# Patient Record
Sex: FEMALE | Race: WHITE | NOT HISPANIC OR LATINO | Employment: FULL TIME | URBAN - METROPOLITAN AREA
[De-identification: names, ages, dates, MRNs, and addresses within clinical notes are randomized per-mention and may not be internally consistent; named-entity substitution may affect disease eponyms.]

---

## 2019-04-23 ENCOUNTER — TELEPHONE (OUTPATIENT)
Dept: SCHEDULING | Facility: IMAGING CENTER | Age: 54
End: 2019-04-23

## 2019-05-14 ENCOUNTER — OFFICE VISIT (OUTPATIENT)
Dept: MEDICAL GROUP | Facility: PHYSICIAN GROUP | Age: 54
End: 2019-05-14
Payer: COMMERCIAL

## 2019-05-14 VITALS
TEMPERATURE: 97.8 F | BODY MASS INDEX: 29.02 KG/M2 | RESPIRATION RATE: 14 BRPM | WEIGHT: 170 LBS | SYSTOLIC BLOOD PRESSURE: 140 MMHG | DIASTOLIC BLOOD PRESSURE: 72 MMHG | HEIGHT: 64 IN | HEART RATE: 80 BPM | OXYGEN SATURATION: 98 %

## 2019-05-14 DIAGNOSIS — M54.50 ACUTE LEFT-SIDED LOW BACK PAIN WITHOUT SCIATICA: ICD-10-CM

## 2019-05-14 DIAGNOSIS — Z13.6 SCREENING FOR CARDIOVASCULAR CONDITION: ICD-10-CM

## 2019-05-14 DIAGNOSIS — Z12.11 SCREENING FOR COLON CANCER: ICD-10-CM

## 2019-05-14 DIAGNOSIS — E66.3 OVERWEIGHT (BMI 25.0-29.9): ICD-10-CM

## 2019-05-14 DIAGNOSIS — Z12.39 BREAST CANCER SCREENING: ICD-10-CM

## 2019-05-14 DIAGNOSIS — E89.0 POSTABLATIVE HYPOTHYROIDISM: ICD-10-CM

## 2019-05-14 DIAGNOSIS — R61 NIGHT SWEATS: ICD-10-CM

## 2019-05-14 DIAGNOSIS — F17.210 CIGARETTE NICOTINE DEPENDENCE WITHOUT COMPLICATION: ICD-10-CM

## 2019-05-14 DIAGNOSIS — I73.00 RAYNAUD'S DISEASE WITHOUT GANGRENE: ICD-10-CM

## 2019-05-14 LAB
APPEARANCE UR: CLEAR
BILIRUB UR STRIP-MCNC: NEGATIVE MG/DL
COLOR UR AUTO: YELLOW
GLUCOSE UR STRIP.AUTO-MCNC: NEGATIVE MG/DL
KETONES UR STRIP.AUTO-MCNC: NEGATIVE MG/DL
LEUKOCYTE ESTERASE UR QL STRIP.AUTO: NEGATIVE
NITRITE UR QL STRIP.AUTO: NEGATIVE
PH UR STRIP.AUTO: 6.5 [PH] (ref 5–8)
PROT UR QL STRIP: NEGATIVE MG/DL
RBC UR QL AUTO: NEGATIVE
SP GR UR STRIP.AUTO: 1.01
UROBILINOGEN UR STRIP-MCNC: NEGATIVE MG/DL

## 2019-05-14 PROCEDURE — 81002 URINALYSIS NONAUTO W/O SCOPE: CPT | Performed by: FAMILY MEDICINE

## 2019-05-14 PROCEDURE — 99204 OFFICE O/P NEW MOD 45 MIN: CPT | Performed by: FAMILY MEDICINE

## 2019-05-14 RX ORDER — LEVOTHYROXINE SODIUM 0.07 MG/1
75 TABLET ORAL
COMMUNITY
End: 2019-09-18 | Stop reason: SDUPTHER

## 2019-05-14 ASSESSMENT — PATIENT HEALTH QUESTIONNAIRE - PHQ9
5. POOR APPETITE OR OVEREATING: 0 - NOT AT ALL
CLINICAL INTERPRETATION OF PHQ2 SCORE: 1
SUM OF ALL RESPONSES TO PHQ QUESTIONS 1-9: 2

## 2019-05-14 NOTE — ASSESSMENT & PLAN NOTE
She has left sided low back pain that has been present for a month.  It travels into the stomach and hip.  It is described as an ache.  It is there consistently and is constant without any aggravating or alleviating factors.  She denies associated rashes.

## 2019-05-14 NOTE — ASSESSMENT & PLAN NOTE
She is concerned about her weight.  She is watching her diet and active at baseline.  Average daily steps are 15,000

## 2019-05-14 NOTE — PROGRESS NOTES
CC:  Back aching, thyroid issues    HISTORY OF THE PRESENT ILLNESS: Patient is a 54 y.o. female. This pleasant patient is here today to establish care and discuss the following chronic conditions    Health Maintenance: Completed      Acute left-sided low back pain  She has left sided low back pain that has been present for a month.  It travels into the stomach and hip.  It is described as an ache.  It is there consistently and is constant without any aggravating or alleviating factors.  She denies associated rashes.    Postablative hypothyroidism  She has a history of Graves disease with a radioactive iodine.  She was treated with levothyroxine and is currently on 75 mcg.  She has not had labs in awhile, but is concerned about her weight.    Cigarette nicotine dependence without complication  She is a current every day smoker.  She is trying to quit.  She has a 30 pack year history.    Overweight (BMI 25.0-29.9)  She is concerned about her weight.  She is watching her diet and active at baseline.  Average daily steps are 15,000    Raynaud's disease without gangrene  She describes episodes where her fingers will get cold and turn white, then turn purple and feel warm.  This has been happening for some time.  She denies a history of skin changes.    Night sweats  She describes having episodes of sweating at night.  She will wake up and be drenched.  Symptoms usually resolve if she turns over or turns the fan on.  She denies any fevers or systemic symptoms.  She has not been having periods for several years.      Allergies: Patient has no known allergies.    Current Outpatient Prescriptions Ordered in [facility]   Medication Sig Dispense Refill   • levothyroxine (SYNTHROID) 75 MCG Tab Take 75 mcg by mouth Every morning on an empty stomach.     • aspirin 81 MG tablet Take 81 mg by mouth every day.       No current [facility]-ordered facility-administered medications on file.        Past Medical History:   Diagnosis Date   • CHF  (congestive heart failure) (HCC)     with Graves, resolved       Past Surgical History:   Procedure Laterality Date   • CATARACT EXTRACTION WITH IOL     • PRIMARY C SECTION     • TUBAL LIGATION         Social History   Substance Use Topics   • Smoking status: Current Every Day Smoker     Packs/day: 1.00     Years: 30.00     Types: Cigarettes   • Smokeless tobacco: Never Used   • Alcohol use 4.2 oz/week     7 Cans of beer per week       Social History     Social History Narrative    [employer]       Family History   Problem Relation Age of Onset   • Cancer Mother         bladder, tobacco   • Hyperlipidemia Mother    • Heart Disease Father         afib   • Diabetes Maternal Grandfather    • Heart Attack Paternal Grandfather 94   • Other Maternal Aunt         MS   • Heart Disease Paternal Aunt         valve replacement       ROS:     - Constitutional: Negative for fever, chills, unexpected weight change, and fatigue/generalized weakness.     - HEENT: Negative for headaches, vision changes, hearing changes, ear pain, ear discharge, rhinorrhea, sinus congestion, sore throat, and neck pain.      - Respiratory: Negative for cough, sputum production, chest congestion, dyspnea, wheezing, and crackles.      - Cardiovascular: Negative for chest pain, palpitations, orthopnea, and bilateral lower extremity edema.     - Gastrointestinal: Negative for heartburn, nausea, vomiting, abdominal pain, hematochezia, melena, diarrhea, constipation, and greasy/foul-smelling stools.     - Genitourinary: Negative for dysuria, polyuria, hematuria, pyuria, urinary urgency, and urinary incontinence.    - Musculoskeletal: Negative for myalgias, back pain, and joint pain.     - Skin: Negative for rash, itching, cyanotic skin color change.     - Neurological: Negative for dizziness, tingling, tremors, focal sensory deficit, focal weakness and headaches.     - Endo/Heme/Allergies: Does not bruise/bleed easily.     -  "Psychiatric/Behavioral: Negative for depression, suicidal/homicidal ideation and memory loss.        Exam: /72 (BP Location: Right arm, Patient Position: Sitting)   Pulse 80   Temp 36.6 °C (97.8 °F)   Resp 14   Ht 1.626 m (5' 4\")   Wt 77.1 kg (170 lb)   SpO2 98%  Body mass index is 29.18 kg/m².    General: Normal appearing. No distress.  HEENT: Normocephalic. Eyes conjunctiva clear lids without ptosis, pupils equal and reactive to light accommodation, ears normal shape and contour, canals are clear bilaterally, tympanic membranes are benign, nasal mucosa benign, oropharynx is without erythema, edema or exudates.   Neck: Supple without JVD or bruit. Thyroid is not enlarged.  Pulmonary: Clear to ausculation.  Normal effort. No rales, ronchi, or wheezing.  Cardiovascular: Regular rate and rhythm without murmur. Carotid and radial pulses are intact and equal bilaterally.  Abdomen: Soft, nontender, nondistended. Normal bowel sounds. Liver and spleen are not palpable, left sided CVA tenderness is present  Neurologic: Grossly nonfocal  Lymph: No cervical, supraclavicular or axillary lymph nodes are palpable  Skin: Warm and dry.  No obvious lesions.  Back: BACK EXAM:    General: alert, no distress  Body habitus: not obese  Gait: normal  Visible deformity of trunk? no  Tenderness of vertebral spinous processes? no  Tenderness of other back or buttock areas? yes - left sided CVA tenderness, no SI joint pain or trochanteric bursitis    REFLEXES:  Knee jerk (L4): left 2+; right 2+  Ankle jerk (S1): left 2+; right 2+    STRENGTH TESTING:    Quadriceps (L4): left 5/5; right 5/5   Hamstrings (L5 and S1): left 5/5; right 5/5   Ankle dorsiflexion (L4 and L5): left 5/5; right 5/5   Ankle plantarflexion (S1): left 5/5; right 5/5  SENSORY EXAMINATION:  Intact to light touch throughout lower extremities    STRAIGHT LEG RAISE TESTING:   Left: raised to 180 degrees; sciatic sx evoked by just raising the leg? no; evoked with " passive ankle dorsiflexion? no   Right: raised to 180 degrees; sciatic sx evoked by just raising the leg? no; evoked with passive ankle dorsiflexion? no        Psych: Normal mood and affect. Alert and oriented x3. Judgment and insight is normal.    Please note that this dictation was created using voice recognition software. I have made every reasonable attempt to correct obvious errors, but I expect that there are errors of grammar and possibly content that I did not discover before finalizing the note.      Assessment/Plan  1. Acute left-sided low back pain without sciatica  Her pain is fairly focal and not associated with urinary changes.  At this point her back exam is also reassuring.  We will order the routine labs in his lungs her kidney function looks okay and symptoms are not worsening further work-up is unlikely to be helpful at this time.  If symptoms are worsening, there are new urinary evidence of changes in urinary function and ultrasound and further labs may be beneficial.  Symptoms are more likely due to mild spondylosis which is manageable.  - POCT Urinalysis    2. Cigarette nicotine dependence without complication  We discussed the importance of tobacco cessation.  I [place] [facility] options and she is not interested at this time.  She will try to quit and we will continue to follow.    3. Postablative hypothyroidism  She has not had recent labs.  She will continue her current medicines and we will confirm normal TSH levels.  - TSH WITH REFLEX TO FT4; Future    4. Overweight (BMI 25.0-29.9)  She is in the overweight category.  We reviewed dietary and lifestyle guidelines.  We will assess thyroid function.    5. Screening for cardiovascular condition  Routine lipid and fasting glucose screening were ordered based on indications.  I also ordered a metabolic panel.  - Lipid Profile; Future  - Comp Metabolic Panel; Future    6. Night sweats  She does describe some symptoms of night sweats that  do not appear to be excessive.  I suspect this is a perimenopausal climacteric state but given her smoking history would like to check a CBC.  - CBC WITH DIFFERENTIAL; Future    7. Raynaud's disease without gangrene  Symptoms are stable.  We discussed symptomatic management of avoiding excessive temperatures.  Consider amlodipine if this becomes unmanageable.    8. Screening for colon cancer  The indications for colon cancer screening were discussed based on her age were discussed.  As she is at average risk the options for colon cancer screening include colonoscopy, annual FIT testing and sigmoidoscopy.  The risks and benefits of the different options were discussed.  She elects to proceed with FIT testing which was ordered.    - OCCULT BLOOD FECES IMMUNOASSAY; Future    9. Breast cancer screening    - [id number]; Future

## 2019-05-14 NOTE — ASSESSMENT & PLAN NOTE
She describes episodes where her fingers will get cold and turn white, then turn purple and feel warm.  This has been happening for some time.  She denies a history of skin changes.

## 2019-05-14 NOTE — ASSESSMENT & PLAN NOTE
She has a history of Graves disease with a radioactive iodine.  She was treated with levothyroxine and is currently on 75 mcg.  She has not had labs in awhile, but is concerned about her weight.

## 2019-05-15 ENCOUNTER — HOSPITAL ENCOUNTER (OUTPATIENT)
Dept: LAB | Facility: MEDICAL CENTER | Age: 54
End: 2019-05-15
Attending: FAMILY MEDICINE
Payer: COMMERCIAL

## 2019-05-15 DIAGNOSIS — E89.0 POSTABLATIVE HYPOTHYROIDISM: ICD-10-CM

## 2019-05-15 DIAGNOSIS — Z13.6 SCREENING FOR CARDIOVASCULAR CONDITION: ICD-10-CM

## 2019-05-15 DIAGNOSIS — R61 NIGHT SWEATS: ICD-10-CM

## 2019-05-15 LAB
ALBUMIN SERPL BCP-MCNC: 4.3 G/DL (ref 3.2–4.9)
ALBUMIN/GLOB SERPL: 1.5 G/DL
ALP SERPL-CCNC: 63 U/L (ref 30–99)
ALT SERPL-CCNC: 14 U/L (ref 2–50)
ANION GAP SERPL CALC-SCNC: 5 MMOL/L (ref 0–11.9)
AST SERPL-CCNC: 15 U/L (ref 12–45)
BASOPHILS # BLD AUTO: 1.3 % (ref 0–1.8)
BASOPHILS # BLD: 0.11 K/UL (ref 0–0.12)
BILIRUB SERPL-MCNC: 0.5 MG/DL (ref 0.1–1.5)
BUN SERPL-MCNC: 10 MG/DL (ref 8–22)
CALCIUM SERPL-MCNC: 9.1 MG/DL (ref 8.5–10.5)
CHLORIDE SERPL-SCNC: 104 MMOL/L (ref 96–112)
CHOLEST SERPL-MCNC: 209 MG/DL (ref 100–199)
CO2 SERPL-SCNC: 30 MMOL/L (ref 20–33)
CREAT SERPL-MCNC: 0.68 MG/DL (ref 0.5–1.4)
EOSINOPHIL # BLD AUTO: 0.23 K/UL (ref 0–0.51)
EOSINOPHIL NFR BLD: 2.6 % (ref 0–6.9)
ERYTHROCYTE [DISTWIDTH] IN BLOOD BY AUTOMATED COUNT: 50.2 FL (ref 35.9–50)
FASTING STATUS PATIENT QL REPORTED: NORMAL
GLOBULIN SER CALC-MCNC: 2.8 G/DL (ref 1.9–3.5)
GLUCOSE SERPL-MCNC: 94 MG/DL (ref 65–99)
HCT VFR BLD AUTO: 45.9 % (ref 37–47)
HDLC SERPL-MCNC: 63 MG/DL
HGB BLD-MCNC: 14.6 G/DL (ref 12–16)
IMM GRANULOCYTES # BLD AUTO: 0.03 K/UL (ref 0–0.11)
IMM GRANULOCYTES NFR BLD AUTO: 0.3 % (ref 0–0.9)
LDLC SERPL CALC-MCNC: 135 MG/DL
LYMPHOCYTES # BLD AUTO: 2.08 K/UL (ref 1–4.8)
LYMPHOCYTES NFR BLD: 23.9 % (ref 22–41)
MCH RBC QN AUTO: 32.3 PG (ref 27–33)
MCHC RBC AUTO-ENTMCNC: 31.8 G/DL (ref 33.6–35)
MCV RBC AUTO: 101.5 FL (ref 81.4–97.8)
MONOCYTES # BLD AUTO: 0.78 K/UL (ref 0–0.85)
MONOCYTES NFR BLD AUTO: 9 % (ref 0–13.4)
NEUTROPHILS # BLD AUTO: 5.48 K/UL (ref 2–7.15)
NEUTROPHILS NFR BLD: 62.9 % (ref 44–72)
NRBC # BLD AUTO: 0 K/UL
NRBC BLD-RTO: 0 /100 WBC
PLATELET # BLD AUTO: 151 K/UL (ref 164–446)
PMV BLD AUTO: 13.7 FL (ref 9–12.9)
POTASSIUM SERPL-SCNC: 4.3 MMOL/L (ref 3.6–5.5)
PROT SERPL-MCNC: 7.1 G/DL (ref 6–8.2)
RBC # BLD AUTO: 4.52 M/UL (ref 4.2–5.4)
SODIUM SERPL-SCNC: 139 MMOL/L (ref 135–145)
TRIGL SERPL-MCNC: 55 MG/DL (ref 0–149)
TSH SERPL DL<=0.005 MIU/L-ACNC: 3.4 UIU/ML (ref 0.38–5.33)
WBC # BLD AUTO: 8.7 K/UL (ref 4.8–10.8)

## 2019-05-15 PROCEDURE — 85025 COMPLETE CBC W/AUTO DIFF WBC: CPT

## 2019-05-15 PROCEDURE — 80053 COMPREHEN METABOLIC PANEL: CPT

## 2019-05-15 PROCEDURE — 36415 COLL VENOUS BLD VENIPUNCTURE: CPT

## 2019-05-15 PROCEDURE — 80061 LIPID PANEL: CPT

## 2019-05-15 PROCEDURE — 84443 ASSAY THYROID STIM HORMONE: CPT

## 2019-05-15 NOTE — ASSESSMENT & PLAN NOTE
She describes having episodes of sweating at night.  She will wake up and be drenched.  Symptoms usually resolve if she turns over or turns the fan on.  She denies any fevers or systemic symptoms.  She has not been having periods for several years.

## 2019-05-16 ENCOUNTER — HOSPITAL ENCOUNTER (OUTPATIENT)
Facility: MEDICAL CENTER | Age: 54
End: 2019-05-16
Attending: FAMILY MEDICINE
Payer: COMMERCIAL

## 2019-05-16 PROCEDURE — 82274 ASSAY TEST FOR BLOOD FECAL: CPT

## 2019-05-19 DIAGNOSIS — Z12.11 SCREENING FOR COLON CANCER: ICD-10-CM

## 2019-05-20 LAB — HEMOCCULT STL QL IA: NEGATIVE

## 2019-06-20 ENCOUNTER — OFFICE VISIT (OUTPATIENT)
Dept: MEDICAL GROUP | Facility: PHYSICIAN GROUP | Age: 54
End: 2019-06-20
Payer: COMMERCIAL

## 2019-06-20 VITALS
DIASTOLIC BLOOD PRESSURE: 74 MMHG | SYSTOLIC BLOOD PRESSURE: 132 MMHG | HEART RATE: 87 BPM | BODY MASS INDEX: 27.77 KG/M2 | TEMPERATURE: 98.1 F | HEIGHT: 65 IN | OXYGEN SATURATION: 96 % | WEIGHT: 166.7 LBS | RESPIRATION RATE: 16 BRPM

## 2019-06-20 DIAGNOSIS — E78.5 HYPERLIPIDEMIA, UNSPECIFIED HYPERLIPIDEMIA TYPE: ICD-10-CM

## 2019-06-20 DIAGNOSIS — D75.89 MACROCYTOSIS WITHOUT ANEMIA: ICD-10-CM

## 2019-06-20 DIAGNOSIS — I10 ESSENTIAL HYPERTENSION: ICD-10-CM

## 2019-06-20 DIAGNOSIS — Z91.89 ENCOUNTER FOR HEPATITIS C VIRUS SCREENING TEST FOR HIGH RISK PATIENT: ICD-10-CM

## 2019-06-20 DIAGNOSIS — E89.0 POSTABLATIVE HYPOTHYROIDISM: ICD-10-CM

## 2019-06-20 DIAGNOSIS — F17.210 CIGARETTE NICOTINE DEPENDENCE WITHOUT COMPLICATION: ICD-10-CM

## 2019-06-20 DIAGNOSIS — Z11.59 ENCOUNTER FOR HEPATITIS C VIRUS SCREENING TEST FOR HIGH RISK PATIENT: ICD-10-CM

## 2019-06-20 PROCEDURE — 99214 OFFICE O/P EST MOD 30 MIN: CPT | Performed by: FAMILY MEDICINE

## 2019-06-20 RX ORDER — NICOTINE 21 MG/24HR
1 PATCH, TRANSDERMAL 24 HOURS TRANSDERMAL EVERY 24 HOURS
Qty: 14 PATCH | Refills: 0 | Status: SHIPPED | OUTPATIENT
Start: 2019-06-20 | End: 2020-08-06

## 2019-06-20 RX ORDER — NICOTINE 21 MG/24HR
1 PATCH, TRANSDERMAL 24 HOURS TRANSDERMAL EVERY 24 HOURS
Qty: 30 PATCH | Refills: 0 | Status: SHIPPED | OUTPATIENT
Start: 2019-06-20 | End: 2020-08-06

## 2019-06-20 NOTE — ASSESSMENT & PLAN NOTE
She is still smoking a pack a day.  She is planning to quit, she is losing a best friend to lung cancer.  She will use patches.

## 2019-06-20 NOTE — ASSESSMENT & PLAN NOTE
Lab Results   Component Value Date/Time    TSHULTRASEN 3.400 05/15/2019 08:31 AM     She is feeling well on her current dose of 75 mcg daily, TSH within range.   Subjective  Kobi Wheatley, 62 y.o. female presents today with:  Chief Complaint   Patient presents with    Hypertension    Hyperlipidemia    Basal Cell Carcinoma       Had flu-now better  Pap due 2022  Colon ca screening due 2021      Past Medical History:   Diagnosis Date    Cancer ([address]     BASAL CELL    Hayfever     Hyperlipidemia     Hypertension     Obesity     Rosacea     Vitamin D deficiency      Past Surgical History:   Procedure Laterality Date    COLONOSCOPY  2011    negative-[facility]  MOHS SURGERY  2008     Social History     Social History    Marital status:      Spouse name: N/A    Number of children: N/A    Years of education: N/A     Occupational History    Not on file. Social History Main Topics    Smoking status: Never Smoker    Smokeless tobacco: Never Used    Alcohol use No    Drug use: Unknown    Sexual activity: Not on file     Other Topics Concern    Not on file     Social History Narrative    No narrative on file     Family History   Problem Relation Age of Onset    Cancer Mother      BREAST    High Blood Pressure Mother     Parkinsonism Mother     Diabetes Mother      No Known Allergies  Current Outpatient Prescriptions   Medication Sig Dispense Refill    atorvastatin (LIPITOR) 20 MG tablet Take 1 tablet by mouth daily 90 tablet 1    hydrochlorothiazide (MICROZIDE) 12.5 MG capsule TAKE ONE CAPSULE BY MOUTH EVERY DAY 90 capsule 1    metFORMIN (GLUCOPHAGE) 500 MG tablet Take 1 tablet by mouth nightly 90 tablet 1    doxycycline hyclate (VIBRA-TABS) 100 MG tablet TAKE 1 TABLET BY MOUTH TWICE A DAY  3    SOOLANTRA 1 % CREA APPLY TO AFFECTED AREAS EVERY DAY  5    fluticasone (FLONASE) 50 MCG/ACT nasal spray 1 spray by Nasal route daily 1 Bottle 3    Calcium Carbonate-Vit D-Min 9165-0022 MG-UNIT CHEW Take 1 tablet by mouth daily. 90 tablet 3     No current facility-administered medications for this visit.       The patient denies any history of thyromegaly. RESPIRATORY:   Clear/ Equal breath sounds /No acute respiratory distress. No wheezes,rales, or rhonchi. No percussive abnormalities    HEART: Regular rhythm without murmur, rub or gallop. ABDOMEN: overwt  Soft, non tender. No masses, guarding or rebound. Normo active bowel sounds. EXTREMITIES:  No edema in any extremity. No cyanosis or clubbing. 2+ dorsalis pedis pulses bilaterally          Assessment & Plan   1. Mixed hyperlipidemia  Lipid Panel    CBC    Comprehensive Metabolic Panel   2. Essential hypertension  hydrochlorothiazide (MICROZIDE) 12.5 MG capsule    Lipid Panel    CBC    Comprehensive Metabolic Panel   3. Basal cell carcinoma, unspecified site     4. Prediabetes  metFORMIN (GLUCOPHAGE) 500 MG tablet    Hemoglobin A1C     No orders of the defined types were placed in this encounter. No orders of the defined types were placed in this encounter. There are no discontinued medications. No Follow-up on file. Is working out  Doing well  Diet changes being made  Anamika Fairbanks is advised to follow up ASAP if condition deteriorates or problems arise and if no information on test results to patient in the next 1 month they are advised to call us.      Nichloe Booker MD

## 2019-06-20 NOTE — ASSESSMENT & PLAN NOTE
Lab Results   Component Value Date/Time    CHOLSTRLTOT 209 (H) 05/15/2019 08:31 AM     (H) 05/15/2019 08:31 AM    HDL 63 05/15/2019 08:31 AM    TRIGLYCERIDE 55 05/15/2019 08:31 AM       The 10-year ASCVD risk score (Saritha PALACIOS Jr., et al., 2013) is: 4.4%

## 2019-06-20 NOTE — PROGRESS NOTES
CC: abnormal labs    HISTORY OF PRESENT ILLNESS: Patient is a 54 y.o. female established patient who presents today to discuss the following chronic conditions    Health Maintenance: Completed    Postablative hypothyroidism  Lab Results   Component Value Date/Time    TSHULTRASEN 3.400 05/15/2019 08:31 AM     She is feeling well on her current dose of 75 mcg daily, TSH within range.    Hyperlipidemia  Lab Results   Component Value Date/Time    CHOLSTRLTOT 209 (H) 05/15/2019 08:31 AM     (H) 05/15/2019 08:31 AM    HDL 63 05/15/2019 08:31 AM    TRIGLYCERIDE 55 05/15/2019 08:31 AM       The 10-year ASCVD risk score (Hesstonabhinav PALACIOS Jr., et al., 2013) is: 4.4%         Cigarette nicotine dependence without complication  She is still smoking a pack a day.  She is planning to quit, she is losing a best friend to lung cancer.  She will use patches.      Macrocytosis without anemia  Lab Results   Component Value Date/Time    WBC 8.7 05/15/2019 08:31 AM    RBC 4.52 05/15/2019 08:31 AM    HEMOGLOBIN 14.6 05/15/2019 08:31 AM    HEMATOCRIT 45.9 05/15/2019 08:31 AM    .5 (H) 05/15/2019 08:31 AM    MCH 32.3 05/15/2019 08:31 AM    MCHC 31.8 (L) 05/15/2019 08:31 AM    MPV 13.7 (H) 05/15/2019 08:31 AM    NEUTSPOLYS 62.90 05/15/2019 08:31 AM    LYMPHOCYTES 23.90 05/15/2019 08:31 AM    MONOCYTES 9.00 05/15/2019 08:31 AM    EOSINOPHILS 2.60 05/15/2019 08:31 AM    BASOPHILS 1.30 05/15/2019 08:31 AM        Her MCV is elevated.  She is drinking above recommendations for women.      Essential hypertension  Her BP is at goal today off medications.  We will continue to monitor.      Patient Active Problem List    Diagnosis Date Noted   • Macrocytosis without anemia 06/20/2019   • Essential hypertension 06/20/2019   • Hyperlipidemia 06/20/2019   • Acute left-sided low back pain 05/14/2019   • Cigarette nicotine dependence without complication 05/14/2019   • Postablative hypothyroidism 05/14/2019   • Overweight (BMI 25.0-29.9) 05/14/2019    • Night sweats 05/14/2019   • Raynaud's disease without gangrene 05/14/2019      Allergies:Patient has no known allergies.    Current Outpatient Prescriptions   Medication Sig Dispense Refill   • nicotine (NICODERM) 21 MG/24HR PATCH 24 HR Apply 1 Patch to skin as directed every 24 hours. 30 Patch 0   • nicotine (NICODERM) 14 MG/24HR PATCH 24 HR Apply 1 Patch to skin as directed every 24 hours. 14 Patch 0   • nicotine (NICODERM) 7 MG/24HR PATCH 24 HR Apply 1 Patch to skin as directed every 24 hours. 14 Patch 0   • levothyroxine (SYNTHROID) 75 MCG Tab Take 75 mcg by mouth Every morning on an empty stomach.     • aspirin 81 MG tablet Take 81 mg by mouth every day.       No current facility-administered medications for this visit.        Social History   Substance Use Topics   • Smoking status: Current Every Day Smoker     Packs/day: 1.00     Years: 30.00     Types: Cigarettes   • Smokeless tobacco: Never Used   • Alcohol use 8.4 oz/week     14 Cans of beer per week     Social History     Social History Narrative    [employer]       Family History   Problem Relation Age of Onset   • Cancer Mother         bladder, tobacco   • Hyperlipidemia Mother    • Heart Disease Father         afib   • Diabetes Maternal Grandfather    • Heart Attack Paternal Grandfather 94   • Other Maternal Aunt         MS   • Heart Disease Paternal Aunt         valve replacement       Review of Systems:      - Constitutional: Negative for fever, chills, unexpected weight change, and fatigue/generalized weakness.       - Respiratory: Negative for cough, sputum production, chest congestion, dyspnea, wheezing, and crackles.      - Cardiovascular: Negative for chest pain, palpitations, orthopnea, and bilateral lower extremity edema.     - Gastrointestinal: Negative for heartburn, nausea, vomiting, abdominal pain, hematochezia, melena, diarrhea, constipation, and greasy/foul-smelling stools.     - Genitourinary: Negative for dysuria,  "polyuria, hematuria, pyuria, urinary urgency, and urinary incontinence.    - Psychiatric/Behavioral: Negative for depression, suicidal/homicidal ideation and memory loss.        Exam:    /74 (BP Location: Right arm, Patient Position: Sitting, BP Cuff Size: Adult)   Pulse 87   Temp 36.7 °C (98.1 °F) (Temporal)   Resp 16   Ht 1.651 m (5' 5\")   Wt 75.6 kg (166 lb 11.2 oz)   SpO2 96%  Body mass index is 27.74 kg/m².    General:  Well nourished, well developed female in NAD  Head is grossly normal.  Neck: Supple without JVD or bruit. Thyroid is not enlarged.  Pulmonary: Clear to ausculation and percussion.  Normal effort. No rales, ronchi, or wheezing.  Cardiovascular: Regular rate and rhythm without murmur. Carotid and radial pulses are intact and equal bilaterally.  Extremities: no clubbing, cyanosis, or edema.      Assessment/Plan:  1. Macrocytosis without anemia  This is a new problem, will investigate for nutritional abnormalities.  - VITAMIN B12; Future  - FOLATE; Future    2. Postablative hypothyroidism  TSH within range, continue current dose of levothyroxine.    3. Essential hypertension  Controlled off medication, if this remains low may have resolved.    4. Cigarette nicotine dependence without complication  she is in preparation regarding tobacco cessation.  General guidelines for tobacco cessation including the risks of smoking and benefits of quitting were discussed.  Pharmacologic options were also reviewed.  she would like to try nicotine patches, the use of which was reviewed and they were prescribed if helpful..    - nicotine (NICODERM) 21 MG/24HR PATCH 24 HR; Apply 1 Patch to skin as directed every 24 hours.  Dispense: 30 Patch; Refill: 0  - nicotine (NICODERM) 14 MG/24HR PATCH 24 HR; Apply 1 Patch to skin as directed every 24 hours.  Dispense: 14 Patch; Refill: 0  - nicotine (NICODERM) 7 MG/24HR PATCH 24 HR; Apply 1 Patch to skin as directed every 24 hours.  Dispense: 14 Patch; Refill: " 0    5. Encounter for hepatitis C virus screening test for high risk patient  Routine screening reviewed, she has had blood transfusions and would like to have this done.  - HEP C VIRUS ANTIBODY; Future    6. Hyperlipidemia, unspecified hyperlipidemia type  her 10 year cardiovascular risk is not elevated.  The recommendations regarding treatment of elevated cholesterol were reviewed.  she does not meet criteria for treatment at this time with aspirin and a statin.   Lifestyle interventions including exercise and a diet low in trans and saturated fat and added cholesterol were reviewed.  We will monitor yearly for now.

## 2019-06-20 NOTE — PATIENT INSTRUCTIONS
"B vitamin complex or B12 supplement daily      High Cholesterol  High cholesterol is a condition in which the blood has high levels of a white, waxy, fat-like substance (cholesterol). The human body needs small amounts of cholesterol. The liver makes all the cholesterol that the body needs. Extra (excess) cholesterol comes from the food that we eat.  Cholesterol is carried from the liver by the blood through the blood vessels. If you have high cholesterol, deposits (plaques) may build up on the walls of your blood vessels (arteries). Plaques make the arteries narrower and stiffer. Cholesterol plaques increase your risk for heart attack and stroke. Work with your health care provider to keep your cholesterol levels in a healthy range.  What increases the risk?  This condition is more likely to develop in people who:  · Eat foods that are high in animal fat (saturated fat) or cholesterol.  · Are overweight.  · Are not getting enough exercise.  · Have a family history of high cholesterol.  What are the signs or symptoms?  There are no symptoms of this condition.  How is this diagnosed?  This condition may be diagnosed from the results of a blood test.  · If you are older than age 20, your health care provider may check your cholesterol every 4-6 years.  · You may be checked more often if you already have high cholesterol or other risk factors for heart disease.  The blood test for cholesterol measures:  · \"Bad\" cholesterol (LDL cholesterol). This is the main type of cholesterol that causes heart disease. The desired level for LDL is less than 100.  · \"Good\" cholesterol (HDL cholesterol). This type helps to protect against heart disease by cleaning the arteries and carrying the LDL away. The desired level for HDL is 60 or higher.  · Triglycerides. These are fats that the body can store or burn for energy. The desired number for triglycerides is lower than 150.  · Total cholesterol. This is a measure of the total amount " of cholesterol in your blood, including LDL cholesterol, HDL cholesterol, and triglycerides. A healthy number is less than 200.  How is this treated?  This condition is treated with diet changes, lifestyle changes, and medicines.  Diet changes  · This may include eating more whole grains, fruits, vegetables, nuts, and fish.  · This may also include cutting back on red meat and foods that have a lot of added sugar.  Lifestyle changes  · Changes may include getting at least 40 minutes of aerobic exercise 3 times a week. Aerobic exercises include walking, biking, and swimming. Aerobic exercise along with a healthy diet can help you maintain a healthy weight.  · Changes may also include quitting smoking.  Medicines  · Medicines are usually given if diet and lifestyle changes have failed to reduce your cholesterol to healthy levels.  · Your health care provider may prescribe a statin medicine. Statin medicines have been shown to reduce cholesterol, which can reduce the risk of heart disease.  Follow these instructions at home:  Eating and drinking  If told by your health care provider:  · Eat chicken (without skin), fish, veal, shellfish, ground turkey breast, and round or loin cuts of red meat.  · Do not eat fried foods or fatty meats, such as hot dogs and salami.  · Eat plenty of fruits, such as apples.  · Eat plenty of vegetables, such as broccoli, potatoes, and carrots.  · Eat beans, peas, and lentils.  · Eat grains such as barley, rice, couscous, and bulgur wheat.  · Eat pasta without cream sauces.  · Use skim or nonfat milk, and eat low-fat or nonfat yogurt and cheeses.  · Do not eat or drink whole milk, cream, ice cream, egg yolks, or hard cheeses.  · Do not eat stick margarine or tub margarines that contain trans fats (also called partially hydrogenated oils).  · Do not eat saturated tropical oils, such as coconut oil and palm oil.  · Do not eat cakes, cookies, crackers, or other baked goods that contain trans  fats.  General instructions  · Exercise as directed by your health care provider. Increase your activity level with activities such as gardening, walking, and taking the stairs.  · Take over-the-counter and prescription medicines only as told by your health care provider.  · Do not use any products that contain nicotine or tobacco, such as cigarettes and e-cigarettes. If you need help quitting, ask your health care provider.  · Keep all follow-up visits as told by your health care provider. This is important.  Contact a health care provider if:  · You are struggling to maintain a healthy diet or weight.  · You need help to start on an exercise program.  · You need help to stop smoking.  Get help right away if:  · You have chest pain.  · You have trouble breathing.  This information is not intended to replace advice given to you by your health care provider. Make sure you discuss any questions you have with your health care provider.  Document Released: 12/18/2006 Document Revised: 07/15/2017 Document Reviewed: 06/17/2017  [employer] Interactive Patient Education © 2017 [employer]

## 2019-06-20 NOTE — LETTER
[facility]  Kayleigh Kirk M.D.  [address]  [facility] [address]  Fax: [phone]   Authorization for Release/Disclosure of   Protected Health Information   Name: CHERYL EMANUEL : 1965 SSN: [id number]   Address: [address] [place] [address] Phone:    [phone] (home)    I authorize the entity listed below to release/disclose the PHI below to:   [facility]/Kayleigh Kirk M.D. and Kayleigh Kirk M.D.   Provider or Entity Name:     Address   City, State, [facility]   Phone:      Fax:     Reason for request: continuity of care   Information to be released:    [  ] LAST COLONOSCOPY,  including any PATH REPORT and follow-up  [  ] LAST FIT/COLOGUARD RESULT [  ] LAST DEXA  [  ] LAST MAMMOGRAM  [  ] LAST PAP  [  ] LAST LABS [  ] RETINA EXAM REPORT  [  ] IMMUNIZATION RECORDS  [  ] Release all info      [  ] Check here and initial the line next to each item to release ALL health information INCLUDING  _____ Care and treatment for drug and / or alcohol abuse  _____ HIV testing, infection status, or AIDS  _____ Genetic Testing    DATES OF SERVICE OR TIME PERIOD TO BE DISCLOSED: _____________  I understand and acknowledge that:  * This Authorization may be revoked at any time by you in writing, except if your health information has already been used or disclosed.  * Your health information that will be used or disclosed as a result of you signing this authorization could be re-disclosed by the recipient. If this occurs, your re-disclosed health information may no longer be protected by State or Federal laws.  * You may refuse to sign this Authorization. Your refusal will not affect your ability to obtain treatment.  * This Authorization becomes effective upon signing and will  on (date) __________.      If no date is indicated, this Authorization will  one (1) year from the signature date.    Name: Cheryl Emanuel    Signature:   Date:     2019       PLEASE  FAX REQUESTED RECORDS BACK TO: [phone]

## 2019-06-20 NOTE — ASSESSMENT & PLAN NOTE
Lab Results   Component Value Date/Time    WBC 8.7 05/15/2019 08:31 AM    RBC 4.52 05/15/2019 08:31 AM    HEMOGLOBIN 14.6 05/15/2019 08:31 AM    HEMATOCRIT 45.9 05/15/2019 08:31 AM    .5 (H) 05/15/2019 08:31 AM    MCH 32.3 05/15/2019 08:31 AM    MCHC 31.8 (L) 05/15/2019 08:31 AM    MPV 13.7 (H) 05/15/2019 08:31 AM    NEUTSPOLYS 62.90 05/15/2019 08:31 AM    LYMPHOCYTES 23.90 05/15/2019 08:31 AM    MONOCYTES 9.00 05/15/2019 08:31 AM    EOSINOPHILS 2.60 05/15/2019 08:31 AM    BASOPHILS 1.30 05/15/2019 08:31 AM        Her MCV is elevated.  She is drinking above recommendations for women.

## 2019-09-23 RX ORDER — LEVOTHYROXINE SODIUM 0.07 MG/1
75 TABLET ORAL
Qty: 90 TAB | Refills: 0 | Status: SHIPPED | OUTPATIENT
Start: 2019-09-23 | End: 2020-01-31

## 2019-10-17 ENCOUNTER — OFFICE VISIT (OUTPATIENT)
Dept: URGENT CARE | Facility: CLINIC | Age: 54
End: 2019-10-17
Payer: COMMERCIAL

## 2019-10-17 ENCOUNTER — APPOINTMENT (OUTPATIENT)
Dept: RADIOLOGY | Facility: IMAGING CENTER | Age: 54
End: 2019-10-17
Attending: PHYSICIAN ASSISTANT
Payer: COMMERCIAL

## 2019-10-17 VITALS
SYSTOLIC BLOOD PRESSURE: 128 MMHG | OXYGEN SATURATION: 94 % | TEMPERATURE: 98 F | BODY MASS INDEX: 27.62 KG/M2 | HEART RATE: 95 BPM | WEIGHT: 166 LBS | DIASTOLIC BLOOD PRESSURE: 72 MMHG

## 2019-10-17 DIAGNOSIS — J22 LRTI (LOWER RESPIRATORY TRACT INFECTION): Primary | ICD-10-CM

## 2019-10-17 DIAGNOSIS — J22 LRTI (LOWER RESPIRATORY TRACT INFECTION): ICD-10-CM

## 2019-10-17 PROCEDURE — 99214 OFFICE O/P EST MOD 30 MIN: CPT | Performed by: PHYSICIAN ASSISTANT

## 2019-10-17 PROCEDURE — 71046 X-RAY EXAM CHEST 2 VIEWS: CPT | Mod: TC | Performed by: PHYSICIAN ASSISTANT

## 2019-10-17 RX ORDER — DOXYCYCLINE HYCLATE 100 MG
100 TABLET ORAL 2 TIMES DAILY
Qty: 14 TAB | Refills: 0 | Status: SHIPPED | OUTPATIENT
Start: 2019-10-17 | End: 2019-10-24

## 2019-10-17 RX ORDER — METHYLPREDNISOLONE 4 MG/1
TABLET ORAL
Qty: 21 TAB | Refills: 0 | Status: SHIPPED
Start: 2019-10-17 | End: 2020-08-06

## 2019-10-17 ASSESSMENT — ENCOUNTER SYMPTOMS
BACK PAIN: 1
DIARRHEA: 0
PALPITATIONS: 0
SHORTNESS OF BREATH: 0
SORE THROAT: 0
NAUSEA: 0
VOMITING: 0
COUGH: 1
SINUS PAIN: 0
CHILLS: 0
WHEEZING: 0
SPUTUM PRODUCTION: 1
FOCAL WEAKNESS: 0
TINGLING: 0
RHINORRHEA: 0
FEVER: 0
HEADACHES: 0
HEMOPTYSIS: 0
SENSORY CHANGE: 0
ABDOMINAL PAIN: 0
MYALGIAS: 1

## 2019-10-17 ASSESSMENT — COPD QUESTIONNAIRES: COPD: 0

## 2019-10-17 NOTE — PROGRESS NOTES
Subjective:      Cheryl Squires is a 54 y.o. female who presents with Cough            Cough   This is a new problem. Episode onset: 3 days. The problem has been gradually worsening. The cough is productive of purulent sputum and productive of brown sputum. Associated symptoms include myalgias. Pertinent negatives include no chest pain, chills, ear congestion, ear pain, fever, headaches, hemoptysis, nasal congestion, postnasal drip, rash, rhinorrhea, sore throat, shortness of breath or wheezing. Associated symptoms comments: Chest congestion and pain in left chest that radiated around the side to the left back. Risk factors for lung disease include smoking/tobacco exposure. She has tried nothing for the symptoms. Her past medical history is significant for pneumonia (pneumonia several years ago- symptoms feel similar). There is no history of asthma, bronchitis or COPD.     Past Medical History:   Diagnosis Date   • CHF (congestive heart failure) (HCC)     with Graves, resolved       Past Surgical History:   Procedure Laterality Date   • CATARACT EXTRACTION WITH IOL     • PRIMARY C SECTION     • TUBAL LIGATION         Family History   Problem Relation Age of Onset   • Cancer Mother         bladder, tobacco   • Hyperlipidemia Mother    • Heart Disease Father         afib   • Diabetes Maternal Grandfather    • Heart Attack Paternal Grandfather 94   • Other Maternal Aunt         MS   • Heart Disease Paternal Aunt         valve replacement       No Known Allergies    Medications, Allergies, and current problem list reviewed today in Epic    Review of Systems   Constitutional: Positive for malaise/fatigue. Negative for chills and fever.   HENT: Negative for congestion, ear discharge, ear pain, postnasal drip, rhinorrhea, sinus pain and sore throat.    Respiratory: Positive for cough and sputum production. Negative for hemoptysis, shortness of breath and wheezing.    Cardiovascular: Negative for chest pain, palpitations  and leg swelling.   Gastrointestinal: Negative for abdominal pain, diarrhea, nausea and vomiting.   Musculoskeletal: Positive for back pain and myalgias.        Left chest wall pain   Skin: Negative for rash.   Neurological: Negative for tingling, sensory change, focal weakness and headaches.     All other systems reviewed and are negative.        Objective:     /72   Pulse 95   Temp 36.7 °C (98 °F)   Wt 75.3 kg (166 lb)   SpO2 94%   BMI 27.62 kg/m²      Physical Exam   Constitutional: She is oriented to person, place, and time. She appears well-developed and well-nourished. No distress.   HENT:   Head: Normocephalic and atraumatic.   Right Ear: Tympanic membrane, external ear and ear canal normal.   Left Ear: Tympanic membrane, external ear and ear canal normal.   Nose: Nose normal.   Mouth/Throat: Uvula is midline, oropharynx is clear and moist and mucous membranes are normal.   Eyes: Conjunctivae are normal.   Cardiovascular: Normal rate, regular rhythm and normal heart sounds. Exam reveals no gallop and no friction rub.   No murmur heard.  Pulmonary/Chest: Effort normal. No respiratory distress. She has no decreased breath sounds. She has no wheezes. She has rhonchi. She has no rales. She exhibits tenderness. She exhibits no crepitus, no edema and no swelling.   Diffuse rhonchi       Neurological: She is alert and oriented to person, place, and time. No cranial nerve deficit.   Skin: Skin is warm and dry. No rash noted.   Psychiatric: She has a normal mood and affect. Her behavior is normal. Judgment and thought content normal.               10/17/2019 10:42 AM    HISTORY/REASON FOR EXAM:  Cough.  Chest congestion    TECHNIQUE/EXAM DESCRIPTION AND NUMBER OF VIEWS:  Two views of the chest.    COMPARISON:  None.    FINDINGS:  The lungs are clear.    The cardiac silhouette: normal in size.    Pleura: There are no pleural effusion or pneumothoraces.    Osseous structures: There is thoracic spondylosis.       Impression       No acute cardiopulmonary abnormality identified.       Assessment/Plan:     1. LRTI (lower respiratory tract infection)  DX-CHEST-2 VIEWS    doxycycline (VIBRAMYCIN) 100 MG Tab    methylPREDNISolone (MEDROL DOSEPAK) 4 MG Tablet Therapy Pack         Current Outpatient Medications:   •  doxycycline (VIBRAMYCIN) 100 MG Tab, Take 1 Tab by mouth 2 times a day for 7 days., Disp: 14 Tab, Rfl: 0  •  methylPREDNISolone (MEDROL DOSEPAK) 4 MG Tablet Therapy Pack, Take as directed on package. 1 pack., Disp: 21 Tab, Rfl: 0      Tobacco cessation counseling was provided to the patient for a duration of three to seven minutes.  Tobacco effects, benefits of cessation and methods to use to achieve this goal were briefly discussed.        Differential diagnoses, Supportive care, and indications for immediate follow-up discussed with patient.   Instructed to return to clinic or nearest emergency department for any change in condition, further concerns, or worsening of symptoms.    The patient demonstrated a good understanding and agreed with the treatment plan.    Sol Church P.A.-C.

## 2019-11-04 ENCOUNTER — TELEPHONE (OUTPATIENT)
Dept: MEDICAL GROUP | Facility: PHYSICIAN GROUP | Age: 54
End: 2019-11-04

## 2019-11-04 DIAGNOSIS — R05.9 COUGH: ICD-10-CM

## 2019-11-04 NOTE — TELEPHONE ENCOUNTER
Pt called stating that she had an upper respiratory infection and went to urgent care. She had a bad reaction to the steroids that she was prescribed so she has not taken them. She stated that she finished the antibiotics but is still having a productive cough and coughing up a thick green mucus. She also states that because of this she is having a lot of trouble sleeping. She would like to know if there was something she could be prescribed or if there was something she could take to help with the mucus and cough.

## 2019-11-05 RX ORDER — BENZONATATE 100 MG/1
100 CAPSULE ORAL 3 TIMES DAILY PRN
Qty: 60 CAP | Refills: 0 | Status: SHIPPED | OUTPATIENT
Start: 2019-11-05 | End: 2020-08-06

## 2019-11-05 NOTE — TELEPHONE ENCOUNTER
I have reviewed her urgent care visit and the imaging.  Her x-ray was negative at the time.  I would not add additional antibiotics at this time with a normal chest x-ray.  She may use over-the-counter cough medications or I can prescribe Tessalon Perles.  I also suggest people to take a nasal steroid such as Flonase and an antihistamine for persistent cough.  I would like to see her in the office if she is having fevers, chills or feeling sicker than previously.

## 2020-02-28 ENCOUNTER — OFFICE VISIT (OUTPATIENT)
Dept: MEDICAL GROUP | Facility: PHYSICIAN GROUP | Age: 55
End: 2020-02-28
Payer: COMMERCIAL

## 2020-02-28 VITALS
SYSTOLIC BLOOD PRESSURE: 118 MMHG | BODY MASS INDEX: 27.71 KG/M2 | OXYGEN SATURATION: 97 % | DIASTOLIC BLOOD PRESSURE: 58 MMHG | WEIGHT: 166.3 LBS | TEMPERATURE: 98.1 F | RESPIRATION RATE: 20 BRPM | HEART RATE: 92 BPM | HEIGHT: 65 IN

## 2020-02-28 DIAGNOSIS — M25.562 LEFT ANTERIOR KNEE PAIN: ICD-10-CM

## 2020-02-28 PROCEDURE — 99213 OFFICE O/P EST LOW 20 MIN: CPT | Performed by: NURSE PRACTITIONER

## 2020-02-28 SDOH — HEALTH STABILITY: MENTAL HEALTH: HOW OFTEN DO YOU HAVE A DRINK CONTAINING ALCOHOL?: 4 OR MORE TIMES A WEEK

## 2020-02-28 ASSESSMENT — PATIENT HEALTH QUESTIONNAIRE - PHQ9: CLINICAL INTERPRETATION OF PHQ2 SCORE: 0

## 2020-02-28 ASSESSMENT — FIBROSIS 4 INDEX: FIB4 SCORE: 1.43

## 2020-02-29 NOTE — PROGRESS NOTES
Chief Complaint   Patient presents with   • Pain     Left knee gout; snaps when she walks        HISTORY OF PRESENT ILLNESS: Patient is a 54 y.o. female, established patient who presents today to discuss medical problems as listed below:    Health Maintenance:  COMPLETED.    Left anterior knee pain  New problem today.  Patient reports left knee pain for the last few months.  She was seen at [facility] on January 6, 2020.  Reviewed hospital discharge note, x-ray of the knee was normal, x-ray of her hips showed osteocyte change bilaterally.  No personal history of gout, extensive family history of gout in father.  Uric acid was never drawn at the time as patient was rushing to be discharged and refused further diagnostics.  Her pain is pretty much constant, comes and goes with exacerbations.  He does report drinking beer and eating foods that can possibly exacerbate gout.  He was advised to follow-up with orthopedics, however did not.  At this time not taking any OTC medications.      Patient Active Problem List    Diagnosis Date Noted   • Left anterior knee pain 02/28/2020   • Macrocytosis without anemia 06/20/2019   • Essential hypertension 06/20/2019   • Hyperlipidemia 06/20/2019   • Acute left-sided low back pain 05/14/2019   • Cigarette nicotine dependence without complication 05/14/2019   • Postablative hypothyroidism 05/14/2019   • Overweight (BMI 25.0-29.9) 05/14/2019   • Night sweats 05/14/2019   • Raynaud's disease without gangrene 05/14/2019        Allergies: Patient has no known allergies.    Current Outpatient Medications   Medication Sig Dispense Refill   • levothyroxine (SYNTHROID) 75 MCG Tab TAKE ONE TABLET BY MOUTH EVERY MORNING ON AN EMPTY STOMACH 90 Tab 0   • aspirin 81 MG tablet Take 81 mg by mouth every day.     • benzonatate (TESSALON) 100 MG Cap Take 1 Cap by mouth 3 times a day as needed for Cough. (Patient not taking: Reported on 2/28/2020) 60 Cap 0   • methylPREDNISolone (MEDROL DOSEPAK) 4 MG Tablet  Therapy Pack Take as directed on package. 1 pack. (Patient not taking: Reported on 2/28/2020) 21 Tab 0   • nicotine (NICODERM) 21 MG/24HR PATCH 24 HR Apply 1 Patch to skin as directed every 24 hours. (Patient not taking: Reported on 10/17/2019) 30 Patch 0   • nicotine (NICODERM) 14 MG/24HR PATCH 24 HR Apply 1 Patch to skin as directed every 24 hours. (Patient not taking: Reported on 10/17/2019) 14 Patch 0   • nicotine (NICODERM) 7 MG/24HR PATCH 24 HR Apply 1 Patch to skin as directed every 24 hours. (Patient not taking: Reported on 10/17/2019) 14 Patch 0     No current facility-administered medications for this visit.        Social History     Tobacco Use   • Smoking status: Current Every Day Smoker     Packs/day: 1.00     Years: 30.00     Pack years: 30.00     Types: Cigarettes   • Smokeless tobacco: Never Used   Substance Use Topics   • Alcohol use: Yes     Alcohol/week: 8.4 oz     Types: 14 Cans of beer per week     Frequency: 4 or more times a week   • Drug use: No     Social History     Social History Narrative    [employer]       Family History   Problem Relation Age of Onset   • Cancer Mother         bladder, tobacco   • Hyperlipidemia Mother    • Heart Disease Father         afib   • Diabetes Maternal Grandfather    • Heart Attack Paternal Grandfather 94   • Other Maternal Aunt         MS   • Heart Disease Paternal Aunt         valve replacement       Allergies, past medical history, past surgical history, family history, social history reviewed and updated.    Review of Systems:     - Constitutional: Negative for fever, chills, unexpected weight change, and fatigue/generalized weakness.     - HEENT: Negative for headaches, vision changes, hearing changes, ear pain, ear discharge, rhinorrhea, sinus congestion, sore throat, and neck pain.      - Respiratory: Negative for cough, sputum production, chest congestion, dyspnea, wheezing, and crackles.      - Cardiovascular: Negative for chest pain,  "palpitations, orthopnea, and bilateral lower extremity edema.     - Gastrointestinal: Negative for heartburn, nausea, vomiting, abdominal pain, hematochezia, melena, diarrhea, constipation, and greasy/foul-smelling stools.     - Genitourinary: Negative for dysuria, polyuria, hematuria, pyuria, urinary urgency, and urinary incontinence.    - Musculoskeletal: Negative for myalgias, back pain, and joint pain.     - Skin: Negative for rash, itching, cyanotic skin color change.     - Neurological: Negative for dizziness, tingling, tremors, focal sensory deficit, focal weakness and headaches.     - Endo/Heme/Allergies: Does not bruise/bleed easily.     - Psychiatric/Behavioral: Negative for depression, suicidal/homicidal ideation and memory loss.      All other systems reviewed and are negative    Exam:    /58 (BP Location: Left arm, Patient Position: Sitting)   Pulse 92   Temp 36.7 °C (98.1 °F)   Resp 20   Ht 1.651 m (5' 5\")   Wt 75.4 kg (166 lb 4.8 oz)   SpO2 97%   BMI 27.67 kg/m²  Body mass index is 27.67 kg/m².    Physical Exam:  Constitutional: Well-developed and well-nourished. Not diaphoretic. No distress.   Skin: Skin is warm and dry. No rash noted.  Head: Atraumatic without lesions.  Eyes: Conjunctivae and extraocular motions are normal. Pupils are equal, round, and reactive to light. No scleral icterus.   Ears:  External ears unremarkable. Tympanic membranes clear and intact.  Nose: Nares patent. Septum midline. Turbinates without erythema nor edema. No discharge.   Mouth/Throat: Dentition is normal. Tongue normal. Oropharynx is clear and moist. Posterior pharynx without erythema or exudates.  Neck: Supple, trachea midline. Normal range of motion. No thyromegaly present. No lymphadenopathy--cervical or supraclavicular.  Cardiovascular: Regular rate and rhythm, S1 and S2 without murmur, rubs, or gallops.    Chest: Effort normal. Clear to auscultation throughout. No adventitious sounds. No CVA " tenderness.  Abdomen: Soft, non tender, and without distention. Active bowel sounds in all four quadrants. No rebound, guarding, masses or HSM.  : Negative for dysuria, polyuria, hematuria, pyuria, urinary urgency, and urinary incontinence.  Extremities: No cyanosis, clubbing, erythema, nor edema. Distal pulses intact and symmetric.   Musculoskeletal: All major joints AROM full in all directions without pain.  Neurological: Alert and oriented x 3. DTRs 2+/3 and symmetric. No cranial nerve deficit. 5/5 myotomes. Sensation intact. Negative Rhomberg.  Psychiatric:  Behavior, mood, and affect are appropriate.    CTH :sure summary results reviewed and discussed with the patient, questions answered.    Patient was seen for 15 minutes face to face of which > 50% of appointment time was spent on counseling and coordination of care regarding the above.     Assessment/Plan:  1. Left anterior knee pain  Uncontrolled, stable.  At this point recommend OTC ibuprofen as needed for left knee, with exacerbations.  Empirically discussed etiology of gout, and dietary control.  Discussed the dietary choices to avoid.  Also recommend OTC vitamin C and cherries for relief as well as OTC ibuprofen.  Discussed the risk factors for NSAIDs such as gastric ulcers and bleeding.  Will check uric acid and communicate findings to patient.  - REFERRAL TO SPORTS MEDICINE  - URIC ACID, SERUM    Discussed with patient possible alternative diagnoses, pt is to take all medications as prescribed. If symptoms persist FU w/PCP, if symptoms worsen go to emergency room. If experiencing any side effects from prescribed medications reports to the office immediately or go to emergency room.  Reviewed indication, dosage, usage and potential adverse effects of prescribed medications. Reviewed risks and benefits of treatment plan. Patient verbalizes understanding of all instruction and verbally agrees to plan.    No follow-ups on file.

## 2020-02-29 NOTE — ASSESSMENT & PLAN NOTE
New problem today.  Patient reports left knee pain for the last few months.  She was seen at [facility] on January 6, 2020.  Reviewed hospital discharge note, x-ray of the knee was normal, x-ray of her hips showed osteocyte change bilaterally.  No personal history of gout, extensive family history of gout in father.  Uric acid was never drawn at the time as patient was rushing to be discharged and refused further diagnostics.  Her pain is pretty much constant, comes and goes with exacerbations.  He does report drinking beer and eating foods that can possibly exacerbate gout.  He was advised to follow-up with orthopedics, however did not.  At this time not taking any OTC medications.

## 2020-03-02 ENCOUNTER — HOSPITAL ENCOUNTER (OUTPATIENT)
Dept: LAB | Facility: MEDICAL CENTER | Age: 55
End: 2020-03-02
Attending: NURSE PRACTITIONER
Payer: COMMERCIAL

## 2020-03-02 PROCEDURE — 36415 COLL VENOUS BLD VENIPUNCTURE: CPT

## 2020-03-02 PROCEDURE — 84550 ASSAY OF BLOOD/URIC ACID: CPT

## 2020-03-03 LAB — URATE SERPL-MCNC: 5.8 MG/DL (ref 1.9–8.2)

## 2020-03-05 ENCOUNTER — TELEPHONE (OUTPATIENT)
Dept: MEDICAL GROUP | Facility: PHYSICIAN GROUP | Age: 55
End: 2020-03-05

## 2020-03-05 NOTE — TELEPHONE ENCOUNTER
----- Message from ROHAN Boswell sent at 3/3/2020  5:36 PM PST -----  Please let patient know I reviewed her recent lab, negative for uric acid.

## 2020-03-05 NOTE — TELEPHONE ENCOUNTER
Phone Number Called: [phone] (home)     Call outcome: Did not leave a detailed message. Requested patient to call back.    Message: 1st attempt

## 2020-03-09 ENCOUNTER — TELEPHONE (OUTPATIENT)
Dept: MEDICAL GROUP | Facility: PHYSICIAN GROUP | Age: 55
End: 2020-03-09

## 2020-03-10 DIAGNOSIS — M25.562 LEFT ANTERIOR KNEE PAIN: ICD-10-CM

## 2020-03-10 NOTE — TELEPHONE ENCOUNTER
Pt called requesting to see a sports medicine facility in [place]. She said she has a hard time with transportation to [place].

## 2020-08-06 ENCOUNTER — OFFICE VISIT (OUTPATIENT)
Dept: MEDICAL GROUP | Facility: PHYSICIAN GROUP | Age: 55
End: 2020-08-06

## 2020-08-06 VITALS
WEIGHT: 167.1 LBS | OXYGEN SATURATION: 94 % | HEIGHT: 65 IN | RESPIRATION RATE: 12 BRPM | HEART RATE: 85 BPM | TEMPERATURE: 98.6 F | BODY MASS INDEX: 27.84 KG/M2 | SYSTOLIC BLOOD PRESSURE: 122 MMHG | DIASTOLIC BLOOD PRESSURE: 60 MMHG

## 2020-08-06 DIAGNOSIS — M54.50 ACUTE LEFT-SIDED LOW BACK PAIN WITHOUT SCIATICA: ICD-10-CM

## 2020-08-06 DIAGNOSIS — M54.50 CHRONIC LEFT-SIDED LOW BACK PAIN WITHOUT SCIATICA: ICD-10-CM

## 2020-08-06 DIAGNOSIS — Z00.00 ANNUAL PHYSICAL EXAM: ICD-10-CM

## 2020-08-06 DIAGNOSIS — Z12.11 SCREENING FOR COLORECTAL CANCER: ICD-10-CM

## 2020-08-06 DIAGNOSIS — G89.29 CHRONIC LEFT-SIDED LOW BACK PAIN WITHOUT SCIATICA: ICD-10-CM

## 2020-08-06 DIAGNOSIS — E89.0 POSTABLATIVE HYPOTHYROIDISM: ICD-10-CM

## 2020-08-06 DIAGNOSIS — Z12.12 SCREENING FOR COLORECTAL CANCER: ICD-10-CM

## 2020-08-06 DIAGNOSIS — Z12.31 ENCOUNTER FOR SCREENING MAMMOGRAM FOR MALIGNANT NEOPLASM OF BREAST: ICD-10-CM

## 2020-08-06 PROCEDURE — 99214 OFFICE O/P EST MOD 30 MIN: CPT | Performed by: NURSE PRACTITIONER

## 2020-08-06 RX ORDER — LEVOTHYROXINE SODIUM 0.07 MG/1
75 TABLET ORAL
Qty: 90 TAB | Refills: 0 | Status: SHIPPED | OUTPATIENT
Start: 2020-08-06 | End: 2020-10-27

## 2020-08-06 ASSESSMENT — FIBROSIS 4 INDEX: FIB4 SCORE: 1.46

## 2020-08-06 NOTE — PROGRESS NOTES
Chief Complaint   Patient presents with   • Establish Care     Thyroid meds refilled       HISTORY OF PRESENT ILLNESS: Patient is a 55 y.o. female, established patient who presents today to discuss medical problems as listed below:    Health Maintenance:  COMPLETED     Postablative hypothyroidism  New to me. Hx of Graves, s/p ablation x 20 yrs ago. Requesting refills today. Denies symptoms insomnia, anxiety, cardiac palpitations, fatigue or weakness.  Last TSH from May 2019 and normal 3.400.    Chronic left-sided low back pain  New to me. New problem. Low back pain on the left side, x 6 mons, worse in the evenings, not consistent, exacerbated by prolonged sitting and relieved by standing or moving.  It is nonradiating.  The severity is staying about the same and ranging from mild to moderate.  She noticed back pain after limping for several months due to her issues in the right leg.  She has never attempted PT.  Right now she has no concerns and declines referral to physical therapy.  She has taken occasional NSAIDs over-the-counter, has tried no other therapy.  She denies blood in stool or urine, denies abdominal discomfort, denies fevers, chills, fatigue or unwanted weight loss.  No personal history of malignancy.  Father recently passed away from pancreatic cancer in mother from bladder cancer.  Patient is concerned and would like to get x-rays of her back.      Patient Active Problem List    Diagnosis Date Noted   • Left anterior knee pain 02/28/2020   • Macrocytosis without anemia 06/20/2019   • Essential hypertension 06/20/2019   • Hyperlipidemia 06/20/2019   • Chronic left-sided low back pain 05/14/2019   • Cigarette nicotine dependence without complication 05/14/2019   • Postablative hypothyroidism 05/14/2019   • Overweight (BMI 25.0-29.9) 05/14/2019   • Night sweats 05/14/2019   • Raynaud's disease without gangrene 05/14/2019        Allergies: Patient has no known allergies.    Current Outpatient  Medications   Medication Sig Dispense Refill   • levothyroxine (SYNTHROID) 75 MCG Tab Take 1 Tab by mouth Every morning on an empty stomach. Needs appointment prior to next refill. 90 Tab 0   • aspirin 81 MG tablet Take 81 mg by mouth every day.       No current facility-administered medications for this visit.        Social History     Tobacco Use   • Smoking status: Current Every Day Smoker     Packs/day: 1.00     Years: 30.00     Pack years: 30.00     Types: Cigarettes   • Smokeless tobacco: Never Used   Substance Use Topics   • Alcohol use: Yes     Alcohol/week: 8.4 oz     Types: 14 Cans of beer per week     Frequency: 4 or more times a week   • Drug use: No     Social History     Social History Narrative    [employer]       Family History   Problem Relation Age of Onset   • Cancer Mother         bladder, tobacco   • Hyperlipidemia Mother    • Heart Disease Father         afib   • Cancer Father         pancreatic ca   • Diabetes Maternal Grandfather    • Heart Attack Paternal Grandfather 94   • Other Maternal Aunt         MS   • Heart Disease Paternal Aunt         valve replacement       Allergies, past medical history, past surgical history, family history, social history reviewed and updated.    Review of Systems:     - Constitutional: Negative for fever, chills, unexpected weight change, and fatigue/generalized weakness.     - HEENT: Negative for headaches, vision changes, hearing changes, ear pain, ear discharge, rhinorrhea, sinus congestion, sore throat, and neck pain.      - Respiratory: Negative for cough, sputum production, chest congestion, dyspnea, wheezing, and crackles.      - Cardiovascular: Negative for chest pain, palpitations, orthopnea, and bilateral lower extremity edema.     - Gastrointestinal: Negative for heartburn, nausea, vomiting, abdominal pain, hematochezia, melena, diarrhea, constipation, and greasy/foul-smelling stools.     - Genitourinary: Negative for dysuria, polyuria,  "hematuria, pyuria, urinary urgency, and urinary incontinence.    - Musculoskeletal: back pain    - Skin: Negative for rash, itching, cyanotic skin color change.     - Neurological: Negative for dizziness, tingling, tremors, focal sensory deficit, focal weakness and headaches.     - Endo/Heme/Allergies: Does not bruise/bleed easily.     - Psychiatric/Behavioral: Negative for depression, suicidal/homicidal ideation and memory loss.      All other systems reviewed and are negative    Exam:    /60 (BP Location: Left arm, Patient Position: Sitting, BP Cuff Size: Adult)   Pulse 85   Temp 37 °C (98.6 °F) (Temporal)   Resp 12   Ht 1.651 m (5' 5\")   Wt 75.8 kg (167 lb 1.6 oz)   SpO2 94%   BMI 27.81 kg/m²  Body mass index is 27.81 kg/m².    Physical Exam:  Constitutional: Well-developed and well-nourished. Not diaphoretic. No distress.   Skin: Skin is warm and dry. No rash noted.  Head: Atraumatic without lesions.  Eyes: Conjunctivae and extraocular motions are normal. Pupils are equal, round, and reactive to light. No scleral icterus.   Ears:  External ears unremarkable. Tympanic membranes clear and intact.  Nose: Nares patent. Septum midline. Turbinates without erythema nor edema. No discharge.   Mouth/Throat: Dentition is normal. Tongue normal. Oropharynx is clear and moist. Posterior pharynx without erythema or exudates.  Neck: Supple, trachea midline. Normal range of motion. No thyromegaly present. No lymphadenopathy--cervical or supraclavicular.  Cardiovascular: Regular rate and rhythm, S1 and S2 without murmur, rubs, or gallops.    Chest: Effort normal. Clear to auscultation throughout. No adventitious sounds. No CVA tenderness.  Abdomen: Soft, non tender, and without distention. Active bowel sounds in all four quadrants. No rebound, guarding, masses or HSM.  : Negative for dysuria, polyuria, hematuria, pyuria, urinary urgency, and urinary incontinence.  Extremities: No cyanosis, clubbing, erythema, nor " edema. Distal pulses intact and symmetric.   Musculoskeletal: tenderness in L lower back exacerbated by bending, straight leg is normal.  Neurological: Alert and oriented x 3. DTRs 2+/3 and symmetric. No cranial nerve deficit. 5/5 myotomes. Sensation intact. Negative Rhomberg.  Psychiatric:  Behavior, mood, and affect are appropriate.  MA/nursing note and vitals reviewed.    LABS: may 2019  results reviewed and discussed with the patient, questions answered.    Patient was seen for 25 minutes face to face of which > 50% of appointment time was spent on counseling and coordination of care regarding the above.     Assessment/Plan:  1. Screening for colorectal cancer  - Occult Blood Feces Immunoassay (FIT); Future    2. Postablative hypothyroidism  - levothyroxine (SYNTHROID) 75 MCG Tab; Take 1 Tab by mouth Every morning on an empty stomach. Needs appointment prior to next refill.  Dispense: 90 Tab; Refill: 0  - TSH; Future  - FREE THYROXINE; Future    3. Annual physical exam  - TSH; Future  - FREE THYROXINE; Future  - Lipid Profile; Future  - Comp Metabolic Panel; Future  - CBC WITH DIFFERENTIAL; Future    4. Encounter for screening mammogram for malignant neoplasm of breast  - MA-SCREENING MAMMO BILAT W/TOMOSYNTHESIS W/CAD; Future    5. Acute left-sided low back pain without sciatica  - DX-LUMBAR SPINE-4+ VIEWS; Future    6. Chronic left-sided low back pain without sciatica  Uncontrolled, stable.  Patient declines the need for PT therapy at this time as she has no insurance.    Discussed with patient possible alternative diagnoses, pt is to take all medications as prescribed. If symptoms persist FU w/PCP, if symptoms worsen go to emergency room. If experiencing any side effects from prescribed medications reports to the office immediately or go to emergency room.  Reviewed indication, dosage, usage and potential adverse effects of prescribed medications. Reviewed risks and benefits of treatment plan. Patient  verbalizes understanding of all instruction and verbally agrees to plan.    Return if symptoms worsen or fail to improve.

## 2020-08-06 NOTE — ASSESSMENT & PLAN NOTE
New to me. New problem. Low back pain on the left side, x 6 mons, worse in the evenings, not consistent, exacerbated by prolonged sitting and relieved by standing or moving.  It is nonradiating.  The severity is staying about the same and ranging from mild to moderate.  She noticed back pain after limping for several months due to her issues in the right leg.  She has never attempted PT.  Right now she has no concerns and declines referral to physical therapy.  She has taken occasional NSAIDs over-the-counter, has tried no other therapy.  She denies blood in stool or urine, denies abdominal discomfort, denies fevers, chills, fatigue or unwanted weight loss.  No personal history of malignancy.  Father recently passed away from pancreatic cancer in mother from bladder cancer.  Patient is concerned and would like to get x-rays of her back.

## 2020-08-06 NOTE — ASSESSMENT & PLAN NOTE
New to me. Hx of Graves, s/p ablation x 20 yrs ago. Requesting refills today. Denies symptoms insomnia, anxiety, cardiac palpitations, fatigue or weakness.  Last TSH from May 2019 and normal 3.400.

## 2020-08-10 ENCOUNTER — HOSPITAL ENCOUNTER (OUTPATIENT)
Dept: LAB | Facility: MEDICAL CENTER | Age: 55
End: 2020-08-10
Attending: NURSE PRACTITIONER

## 2020-08-10 DIAGNOSIS — E89.0 POSTABLATIVE HYPOTHYROIDISM: ICD-10-CM

## 2020-08-10 DIAGNOSIS — Z00.00 ANNUAL PHYSICAL EXAM: ICD-10-CM

## 2020-08-10 LAB
ALBUMIN SERPL BCP-MCNC: 4.4 G/DL (ref 3.2–4.9)
ALBUMIN/GLOB SERPL: 1.7 G/DL
ALP SERPL-CCNC: 75 U/L (ref 30–99)
ALT SERPL-CCNC: 17 U/L (ref 2–50)
ANION GAP SERPL CALC-SCNC: 11 MMOL/L (ref 7–16)
AST SERPL-CCNC: 18 U/L (ref 12–45)
BASOPHILS # BLD AUTO: 1.1 % (ref 0–1.8)
BASOPHILS # BLD: 0.1 K/UL (ref 0–0.12)
BILIRUB SERPL-MCNC: 0.4 MG/DL (ref 0.1–1.5)
BUN SERPL-MCNC: 11 MG/DL (ref 8–22)
CALCIUM SERPL-MCNC: 9.3 MG/DL (ref 8.5–10.5)
CHLORIDE SERPL-SCNC: 99 MMOL/L (ref 96–112)
CHOLEST SERPL-MCNC: 192 MG/DL (ref 100–199)
CO2 SERPL-SCNC: 29 MMOL/L (ref 20–33)
CREAT SERPL-MCNC: 0.59 MG/DL (ref 0.5–1.4)
EOSINOPHIL # BLD AUTO: 0.36 K/UL (ref 0–0.51)
EOSINOPHIL NFR BLD: 4.1 % (ref 0–6.9)
ERYTHROCYTE [DISTWIDTH] IN BLOOD BY AUTOMATED COUNT: 48.1 FL (ref 35.9–50)
GLOBULIN SER CALC-MCNC: 2.6 G/DL (ref 1.9–3.5)
GLUCOSE SERPL-MCNC: 89 MG/DL (ref 65–99)
HCT VFR BLD AUTO: 42.7 % (ref 37–47)
HDLC SERPL-MCNC: 60 MG/DL
HGB BLD-MCNC: 14 G/DL (ref 12–16)
IMM GRANULOCYTES # BLD AUTO: 0.03 K/UL (ref 0–0.11)
IMM GRANULOCYTES NFR BLD AUTO: 0.3 % (ref 0–0.9)
LDLC SERPL CALC-MCNC: 118 MG/DL
LYMPHOCYTES # BLD AUTO: 2.65 K/UL (ref 1–4.8)
LYMPHOCYTES NFR BLD: 30.4 % (ref 22–41)
MCH RBC QN AUTO: 33.1 PG (ref 27–33)
MCHC RBC AUTO-ENTMCNC: 32.8 G/DL (ref 33.6–35)
MCV RBC AUTO: 100.9 FL (ref 81.4–97.8)
MONOCYTES # BLD AUTO: 0.88 K/UL (ref 0–0.85)
MONOCYTES NFR BLD AUTO: 10.1 % (ref 0–13.4)
NEUTROPHILS # BLD AUTO: 4.7 K/UL (ref 2–7.15)
NEUTROPHILS NFR BLD: 54 % (ref 44–72)
NRBC # BLD AUTO: 0 K/UL
NRBC BLD-RTO: 0 /100 WBC
PLATELET # BLD AUTO: 176 K/UL (ref 164–446)
PMV BLD AUTO: 13.6 FL (ref 9–12.9)
POTASSIUM SERPL-SCNC: 4.6 MMOL/L (ref 3.6–5.5)
PROT SERPL-MCNC: 7 G/DL (ref 6–8.2)
RBC # BLD AUTO: 4.23 M/UL (ref 4.2–5.4)
SODIUM SERPL-SCNC: 139 MMOL/L (ref 135–145)
T4 FREE SERPL-MCNC: 1.12 NG/DL (ref 0.93–1.7)
TRIGL SERPL-MCNC: 68 MG/DL (ref 0–149)
TSH SERPL DL<=0.005 MIU/L-ACNC: 3.52 UIU/ML (ref 0.38–5.33)
WBC # BLD AUTO: 8.7 K/UL (ref 4.8–10.8)

## 2020-08-10 PROCEDURE — 84443 ASSAY THYROID STIM HORMONE: CPT

## 2020-08-10 PROCEDURE — 80053 COMPREHEN METABOLIC PANEL: CPT

## 2020-08-10 PROCEDURE — 85025 COMPLETE CBC W/AUTO DIFF WBC: CPT

## 2020-08-10 PROCEDURE — 80061 LIPID PANEL: CPT

## 2020-08-10 PROCEDURE — 84439 ASSAY OF FREE THYROXINE: CPT

## 2020-08-10 PROCEDURE — 36415 COLL VENOUS BLD VENIPUNCTURE: CPT

## 2020-10-25 DIAGNOSIS — E89.0 POSTABLATIVE HYPOTHYROIDISM: ICD-10-CM

## 2020-10-27 RX ORDER — LEVOTHYROXINE SODIUM 0.07 MG/1
TABLET ORAL
Qty: 90 TAB | Refills: 0 | Status: SHIPPED | OUTPATIENT
Start: 2020-10-27 | End: 2021-01-26

## 2021-01-26 DIAGNOSIS — E89.0 POSTABLATIVE HYPOTHYROIDISM: ICD-10-CM

## 2021-01-26 RX ORDER — LEVOTHYROXINE SODIUM 0.07 MG/1
TABLET ORAL
Qty: 90 TAB | Refills: 0 | Status: SHIPPED | OUTPATIENT
Start: 2021-01-26 | End: 2021-04-26

## 2021-01-26 NOTE — TELEPHONE ENCOUNTER
Received request via: Patient    Was the patient seen in the last year in this department? Yes    Does the patient have an active prescription (recently filled or refills available) for medication(s) requested? yes

## 2021-04-25 DIAGNOSIS — E89.0 POSTABLATIVE HYPOTHYROIDISM: ICD-10-CM

## 2021-04-28 RX ORDER — LEVOTHYROXINE SODIUM 0.07 MG/1
TABLET ORAL
Qty: 90 TABLET | Refills: 0 | Status: SHIPPED | OUTPATIENT
Start: 2021-04-28 | End: 2021-07-28

## 2021-07-28 DIAGNOSIS — E89.0 POSTABLATIVE HYPOTHYROIDISM: ICD-10-CM

## 2021-07-28 DIAGNOSIS — I10 ESSENTIAL HYPERTENSION: ICD-10-CM

## 2021-07-28 DIAGNOSIS — Z00.00 ANNUAL PHYSICAL EXAM: ICD-10-CM

## 2021-08-29 DIAGNOSIS — E89.0 POSTABLATIVE HYPOTHYROIDISM: ICD-10-CM

## 2021-08-30 RX ORDER — LEVOTHYROXINE SODIUM 0.07 MG/1
TABLET ORAL
Qty: 30 TABLET | Refills: 0 | OUTPATIENT
Start: 2021-08-30

## 2021-08-30 NOTE — TELEPHONE ENCOUNTER
Phone Number Called: [phone] (home)       Call outcome: Left detailed message for patient. Informed to call back with any additional questions.    Message: Called to inform patient to please call [phone] to make an appt for continued medication refills

## 2021-08-31 ENCOUNTER — OFFICE VISIT (OUTPATIENT)
Dept: MEDICAL GROUP | Facility: PHYSICIAN GROUP | Age: 56
End: 2021-08-31

## 2021-08-31 VITALS
WEIGHT: 158 LBS | HEART RATE: 83 BPM | RESPIRATION RATE: 12 BRPM | OXYGEN SATURATION: 95 % | TEMPERATURE: 99.2 F | HEIGHT: 64 IN | DIASTOLIC BLOOD PRESSURE: 78 MMHG | BODY MASS INDEX: 26.98 KG/M2 | SYSTOLIC BLOOD PRESSURE: 130 MMHG

## 2021-08-31 DIAGNOSIS — Z12.12 SCREENING FOR COLORECTAL CANCER: ICD-10-CM

## 2021-08-31 DIAGNOSIS — I10 ESSENTIAL HYPERTENSION: ICD-10-CM

## 2021-08-31 DIAGNOSIS — E89.0 POSTABLATIVE HYPOTHYROIDISM: ICD-10-CM

## 2021-08-31 DIAGNOSIS — Z12.11 SCREENING FOR COLORECTAL CANCER: ICD-10-CM

## 2021-08-31 DIAGNOSIS — K59.1 FUNCTIONAL DIARRHEA: ICD-10-CM

## 2021-08-31 DIAGNOSIS — Z12.31 ENCOUNTER FOR SCREENING MAMMOGRAM FOR BREAST CANCER: ICD-10-CM

## 2021-08-31 DIAGNOSIS — F17.210 CIGARETTE NICOTINE DEPENDENCE WITHOUT COMPLICATION: ICD-10-CM

## 2021-08-31 PROBLEM — M25.562 LEFT ANTERIOR KNEE PAIN: Status: RESOLVED | Noted: 2020-02-28 | Resolved: 2021-08-31

## 2021-08-31 PROCEDURE — 99213 OFFICE O/P EST LOW 20 MIN: CPT | Performed by: NURSE PRACTITIONER

## 2021-08-31 RX ORDER — LEVOTHYROXINE SODIUM 0.07 MG/1
TABLET ORAL
Qty: 14 TABLET | Refills: 0 | Status: SHIPPED
Start: 2021-08-31 | End: 2021-09-07

## 2021-08-31 ASSESSMENT — ENCOUNTER SYMPTOMS
BLOOD IN STOOL: 0
SHORTNESS OF BREATH: 0
WEIGHT LOSS: 0
WHEEZING: 0
WEAKNESS: 0
FEVER: 0
DEPRESSION: 0
SPUTUM PRODUCTION: 1
PALPITATIONS: 0
DIZZINESS: 0
NERVOUS/ANXIOUS: 1
CHILLS: 0
DIARRHEA: 1
ABDOMINAL PAIN: 0
COUGH: 1
CONSTIPATION: 0
HEADACHES: 0

## 2021-08-31 ASSESSMENT — FIBROSIS 4 INDEX: FIB4 SCORE: 1.389067670662634303

## 2021-08-31 ASSESSMENT — PATIENT HEALTH QUESTIONNAIRE - PHQ9: CLINICAL INTERPRETATION OF PHQ2 SCORE: 0

## 2021-08-31 NOTE — ASSESSMENT & PLAN NOTE
Chronic and stable condition   Refills requested today  Has not had labs completed that have been ordered  Last TSH in 2020  Denies any new palpitations, slow heart rate, fatigue or weakness.  Results for ADELFO EDWARD YEE (MRN [id number]) as of 8/31/2021 07:55   Ref. Range 8/10/2020 10:03   TSH Latest Ref Range: 0.380 - 5.330 uIU/mL 3.520   Free T-4 Latest Ref Range: 0.93 - 1.70 ng/dL 1.12

## 2021-08-31 NOTE — PROGRESS NOTES
CC:  Chief Complaint   Patient presents with   • Hypothyroidism     med refill        HISTORY OF PRESENT ILLNESS: Patient is a 56 y.o. female established patient presenting for medication refill.       Cigarette nicotine dependence without complication  Chronic and stable condition   Smoking 1 ppd  Smokers cough with increase in mucus, no new pattern  Working on cessation of smoking. Has been using a straw for the oral fixation     Postablative hypothyroidism  Chronic and stable condition   Refills requested today  Has not had labs completed that have been ordered  Last TSH in 2020  Denies any new palpitations, slow heart rate, fatigue or weakness.  Results for EDWARD EMANUEL (MRN [id number]) as of 8/31/2021 07:55   Ref. Range 8/10/2020 10:03   TSH Latest Ref Range: 0.380 - 5.330 uIU/mL 3.520   Free T-4 Latest Ref Range: 0.93 - 1.70 ng/dL 1.12       Essential hypertension  Chronic and stable condition   No currently on any medications for this  Has been watching sodium daily  130/78 in office today    Functional diarrhea  Acute and uncomplicated  For last month but believes it is related to her stress  Recently had a memorial for her dad, her daughter who is pregnant recently had COVID.  Denies blood in her stool  Needs Colorectal screening      Patient Active Problem List    Diagnosis Date Noted   • Functional diarrhea 08/31/2021   • Macrocytosis without anemia 06/20/2019   • Essential hypertension 06/20/2019   • Hyperlipidemia 06/20/2019   • Chronic left-sided low back pain 05/14/2019   • Cigarette nicotine dependence without complication 05/14/2019   • Postablative hypothyroidism 05/14/2019   • Overweight (BMI 25.0-29.9) 05/14/2019   • Night sweats 05/14/2019   • Raynaud's disease without gangrene 05/14/2019      Allergies:Patient has no known allergies.    Current Outpatient Medications   Medication Sig Dispense Refill   • levothyroxine (SYNTHROID) 75 MCG Tab TAKE ONE TABLET BY MOUTH EVERY MORNING ON AN  "EMPTY STOMACH 14 Tablet 0   • aspirin 81 MG tablet Take 81 mg by mouth every day.       No current facility-administered medications for this visit.       Social History     Tobacco Use   • Smoking status: Current Every Day Smoker     Packs/day: 1.00     Years: 30.00     Pack years: 30.00     Types: Cigarettes   • Smokeless tobacco: Never Used   Vaping Use   • Vaping Use: Never used   Substance Use Topics   • Alcohol use: Yes     Alcohol/week: 8.4 oz     Types: 14 Cans of beer per week   • Drug use: No     Social History     Social History Narrative    [employer]       Family History   Problem Relation Age of Onset   • Cancer Mother         bladder, tobacco   • Hyperlipidemia Mother    • Heart Disease Father         afib   • Cancer Father         pancreatic ca   • Diabetes Maternal Grandfather    • Heart Attack Paternal Grandfather 94   • Other Maternal Aunt         MS   • Heart Disease Paternal Aunt         valve replacement        Review of Systems   Constitutional: Negative for chills, fever, malaise/fatigue and weight loss.   Respiratory: Positive for cough and sputum production. Negative for shortness of breath and wheezing.         Smokers cough, clear sputum   Cardiovascular: Negative for chest pain, palpitations and leg swelling.   Gastrointestinal: Positive for diarrhea. Negative for abdominal pain, blood in stool and constipation.        Thinks it is due to stress   Skin: Negative.    Neurological: Negative for dizziness, weakness and headaches.   Psychiatric/Behavioral: Negative for depression and suicidal ideas. The patient is nervous/anxious.         Regarding her daughters health, and covid vaccine on thursday             - NOTE: All other systems reviewed and are negative, except as in HPI.      Exam:    /78 (BP Location: Left arm, Patient Position: Sitting, BP Cuff Size: Adult)   Pulse 83   Temp 37.3 °C (99.2 °F) (Temporal)   Resp 12   Ht 1.626 m (5' 4\")   Wt 71.7 kg (158 lb)   " SpO2 95%  Body mass index is 27.12 kg/m².    General: Alert, pleasant, NAD  EYES:   PERRL, EOMI, no icterus or pallor.  Conjunctivae and lids normal.   HENT:  Normocephalic.  External ears normal. Tympanic membranes pearly, opaque.  No nasal drainage present.  Oropharynx non-erythematous, mucous membranes moist.  Neck supple.   No thyromegaly or masses palpated. No cervical or supraclavicular lymphadenopathy.  Heart: Regular rate and rhythm.  S1 and S2 normal.  No murmurs appreciated.  Respiratory: Normal respiratory effort.  Clear to auscultation bilaterally.  Abdomen: Non-distended, soft, non-tender, no guarding/rebound. Bowel sounds present.  No hepatosplenomegaly.  No hernias.  Skin: Warm, dry, no rashes.  Musculoskeletal: Gait is normal.  Moves all extremities well.    Extremities: No clubbing, cyanosis or edema noted.   Neurological: No tremors, sensation grossly intact, tone/strength normal, gait is normal, CN2-12 intact.  Psych:  Affect/mood is normal, judgement is good, memory is intact, grooming is appropriate.      Assessment/Plan:    Patient is self pay and it was discussed to look into cash clinical as well as the different labs to see cost.   Also provided pt with information for mamovan and [facility] to see if there is any sliding scale for imaging. Discussed with pt to call scheduling to see if they can discuss cost for imaging.   Provided pt with further information for pneumonia vaccine, flu vaccine and Tdap as she is going to be a grandmother in November 1. Postablative hypothyroidism  - levothyroxine (SYNTHROID) 75 MCG Tab; TAKE ONE TABLET BY MOUTH EVERY MORNING ON AN EMPTY STOMACH  Dispense: 14 Tablet; Refill: 0  Provided pt with 2 weeks of refills  Discussed with pt need to get labs for further refills    2. Cigarette nicotine dependence without complication  Continue to work on smoking cessation   Patient was counseled for over 5 minutes regarding smoking cessation in addition to the  time spent for the visit. The patient was counseled on the reasons that they should quit tobacco usage including the increases risk for cardiovascular events and cancer. The patient was counseled on ways to stop smoking including the use of nicotine replacement products, buspar, chantix, and 1-800-quit-now as a resource. All questions were answered appropriately and hopefully this counseling will help the patient with regards to stopping the use of tobacco in the future.   Does not want any medication to assist with smoking cessation  at this time     3. Essential hypertension  Continue to monitor    4. Functional diarrhea  Continue to monitor  Discuss use of bulking agents such as fiber    5. Encounter for screening mammogram for breast cancer  - MA-SCREENING MAMMO BILAT W/CAD; Future    6. Screening for colorectal cancer  - Occult Blood Feces Immunoassay (FIT); Future       Discussed with patient possible alternative diagnoses, patient is to take all medications as prescribed.     If symptoms persist FU w/PCP, if symptoms worsen go to emergency room.     If experiencing any side effects from prescribed medications reports to the office immediately or go to emergency room.    Reviewed indication, dosage, usage and potential adverse effects of prescribed medications.     Reviewed risks and benefits of treatment plan. Patient verbalizes understanding of all instruction and verbally agrees to plan.      No follow-ups on file.    My total time spent caring for the patient on the day of the encounter was 20 minutes.   This does not include time spent on separately billable procedures/tests.     Please note that this dictation was created using voice recognition software. I have made every reasonable attempt to correct obvious errors, but I expect that there are errors of grammar and possibly content that I did not discover before finalizing the note.

## 2021-08-31 NOTE — ASSESSMENT & PLAN NOTE
Acute and uncomplicated  For last month but believes it is related to her stress  Recently had a memorial for her dad, her daughter who is pregnant recently had COVID.  Denies blood in her stool  Needs Colorectal screening

## 2021-08-31 NOTE — PATIENT INSTRUCTIONS
1. Get labs completed prior to our next visit. To get more refills on medications.  These will be fasting labs, do not eat or drink 8 to 10 hours prior to your labs.  Make sure that you drink lots of water.  We will discuss the results of these at our next appointment.     2.  Prescriptions for levothyroxine sent to [facility] in [place].    3.  Orders for FIT test (stool) , Mammogram. Call [phone] to schedule mammogram OR     4. Mammovan- see if there is a cheaper option OR [facility]      5. Check with Pharmacy for Flu vaccine and Pneumonia Vaccine, Tdap for Pertussis (whooping) before baby is due

## 2021-08-31 NOTE — ASSESSMENT & PLAN NOTE
Chronic and stable condition   Smoking 1 ppd  Smokers cough with increase in mucus, no new pattern  Working on cessation of smoking. Has been using a straw for the oral fixation

## 2021-08-31 NOTE — ASSESSMENT & PLAN NOTE
Chronic and stable condition   No currently on any medications for this  Has been watching sodium daily  130/78 in office today

## 2021-09-07 ENCOUNTER — TELEPHONE (OUTPATIENT)
Dept: MEDICAL GROUP | Facility: PHYSICIAN GROUP | Age: 56
End: 2021-09-07

## 2021-09-07 DIAGNOSIS — E89.0 POSTABLATIVE HYPOTHYROIDISM: ICD-10-CM

## 2021-09-07 RX ORDER — LEVOTHYROXINE SODIUM 88 UG/1
88 TABLET ORAL
Qty: 90 TABLET | Refills: 0 | Status: SHIPPED | OUTPATIENT
Start: 2021-09-07 | End: 2021-12-03

## 2021-09-07 NOTE — PROGRESS NOTES
Reviewed recent labs with TSH at 5.5 and T4 at 1.07 from 9/1/2021 lab results.  On levothyroxine previous dose of 75 mcg.

## 2021-11-23 ENCOUNTER — APPOINTMENT (OUTPATIENT)
Dept: MEDICAL GROUP | Facility: PHYSICIAN GROUP | Age: 56
End: 2021-11-23

## 2021-12-03 DIAGNOSIS — E89.0 POSTABLATIVE HYPOTHYROIDISM: ICD-10-CM

## 2021-12-03 RX ORDER — LEVOTHYROXINE SODIUM 88 UG/1
TABLET ORAL
Qty: 90 TABLET | Refills: 0 | Status: SHIPPED | OUTPATIENT
Start: 2021-12-03 | End: 2022-03-14

## 2021-12-09 ENCOUNTER — OFFICE VISIT (OUTPATIENT)
Dept: MEDICAL GROUP | Facility: PHYSICIAN GROUP | Age: 56
End: 2021-12-09

## 2021-12-09 VITALS
TEMPERATURE: 96.5 F | RESPIRATION RATE: 12 BRPM | DIASTOLIC BLOOD PRESSURE: 72 MMHG | HEIGHT: 64 IN | SYSTOLIC BLOOD PRESSURE: 128 MMHG | OXYGEN SATURATION: 96 % | WEIGHT: 161.4 LBS | HEART RATE: 95 BPM | BODY MASS INDEX: 27.55 KG/M2

## 2021-12-09 DIAGNOSIS — E89.0 POSTABLATIVE HYPOTHYROIDISM: ICD-10-CM

## 2021-12-09 DIAGNOSIS — Z00.00 ANNUAL PHYSICAL EXAM: ICD-10-CM

## 2021-12-09 DIAGNOSIS — E78.5 HYPERLIPIDEMIA, UNSPECIFIED HYPERLIPIDEMIA TYPE: ICD-10-CM

## 2021-12-09 DIAGNOSIS — F17.210 CIGARETTE NICOTINE DEPENDENCE WITHOUT COMPLICATION: ICD-10-CM

## 2021-12-09 PROBLEM — E78.2 MIXED HYPERLIPIDEMIA: Status: ACTIVE | Noted: 2021-12-09

## 2021-12-09 PROCEDURE — 99214 OFFICE O/P EST MOD 30 MIN: CPT | Performed by: NURSE PRACTITIONER

## 2021-12-09 ASSESSMENT — FIBROSIS 4 INDEX: FIB4 SCORE: 1.389067670662634303

## 2021-12-09 NOTE — PROGRESS NOTES
Chief Complaint   Patient presents with   • Medication Refill     levothyroxine (SYNTHROID) 88 MCG Tab       HISTORY OF PRESENT ILLNESS: Patient is a 56 y.o. female, established patient who presents today to discuss medical problems as listed below:    Health Maintenance:  COMPLETED     Cigarette nicotine dependence without complication  Hx 36 smoking pack yrs. tried to quit multiple times unsuccessfully d/t anxiety.  Patient states there is a lot going on and this is not a good time to quit smoking.  Currently she denies CP, dyspnea, hemoptysis.  She does admit to smokers cough. She is interested in screening for lung cancer.    Postablative hypothyroidism  Chronic and stable condition.  Needing refills today.  Most recent thyroid 9/1/21:  TSH 5.50,  T4 1.07  Currently on Levothyroxine 88 mcg, doing well, no hypo or hyper thyroid symptoms.    Hyperlipidemia  Chronic and stable condition  Last lipid panel 9/1/21  , HDL 64, trigs 67      Patient Active Problem List    Diagnosis Date Noted   • Mixed hyperlipidemia 12/09/2021   • Functional diarrhea 08/31/2021   • Macrocytosis without anemia 06/20/2019   • Essential hypertension 06/20/2019   • Hyperlipidemia 06/20/2019   • Chronic left-sided low back pain 05/14/2019   • Cigarette nicotine dependence without complication 05/14/2019   • Postablative hypothyroidism 05/14/2019   • Overweight (BMI 25.0-29.9) 05/14/2019   • Night sweats 05/14/2019   • Raynaud's disease without gangrene 05/14/2019        Allergies: Patient has no known allergies.    Current Outpatient Medications   Medication Sig Dispense Refill   • levothyroxine (SYNTHROID) 88 MCG Tab TAKE ONE TABLET BY MOUTH EVERY MORNING ON AN EMPTY STOMACH 90 Tablet 0   • aspirin 81 MG tablet Take 81 mg by mouth every day.       No current facility-administered medications for this visit.       Social History     Tobacco Use   • Smoking status: Current Every Day Smoker     Packs/day: 1.00     Years: 30.00     Pack  "years: 30.00     Types: Cigarettes   • Smokeless tobacco: Never Used   Vaping Use   • Vaping Use: Never used   Substance Use Topics   • Alcohol use: Yes     Alcohol/week: 8.4 oz     Types: 14 Cans of beer per week   • Drug use: No     Social History     Social History Narrative    [employer]       Family History   Problem Relation Age of Onset   • Cancer Mother         bladder, tobacco   • Hyperlipidemia Mother    • Heart Disease Father         afib   • Cancer Father         pancreatic ca   • Diabetes Maternal Grandfather    • Heart Attack Paternal Grandfather 94   • Other Maternal Aunt         MS   • Heart Disease Paternal Aunt         valve replacement       Allergies, past medical history, past surgical history, family history, social history reviewed and updated.    Review of Systems:     - Constitutional: Negative for fever, chills, unexpected weight change, and fatigue/generalized weakness.     - Respiratory: Negative for cough, sputum production, chest congestion, dyspnea, wheezing, and crackles.      - Cardiovascular: Negative for chest pain, palpitations, orthopnea, and bilateral lower extremity edema.     - Psychiatric/Behavioral: Negative for depression, suicidal/homicidal ideation and memory loss.      All other systems reviewed and are negative    Exam:    /72 (BP Location: Left arm, Patient Position: Sitting, BP Cuff Size: Adult)   Pulse 95   Temp 35.8 °C (96.5 °F) (Temporal)   Resp 12   Ht 1.626 m (5' 4\")   Wt 73.2 kg (161 lb 6.4 oz)   SpO2 96%   BMI 27.70 kg/m²  Body mass index is 27.7 kg/m².    Physical Exam:  Constitutional: Well-developed and well-nourished. Not diaphoretic. No distress.   Cardiovascular: Regular rate and rhythm, S1 and S2 without murmur, rubs, or gallops.    Chest: Effort normal. Clear to auscultation throughout. No adventitious sounds. No   Neurological: Alert and oriented x 3.   Psychiatric:  Behavior, mood, and affect are appropriate.  MA/nursing note " and vitals reviewed.    LABS: 9/21  results reviewed and discussed with the patient, questions answered.    Assessment/Plan:  1. Cigarette nicotine dependence without complication  Patient counseled. Not interested in quitting at this time d/t increased stress. Discussed Rx alternatives, pt would like to hold off and explore at a different time  - REFERRAL TO LUNG CANCER SCREENING PROGRAM    2. Annual physical exam  - Comp Metabolic Panel; Future  - CBC WITHOUT DIFFERENTIAL; Future  - Lipid Profile; Future    3. Postablative hypothyroidism  Stable on current regimen.  Continue.  Refills given  - TSH; Future  - FREE THYROXINE; Future    4. Hyperlipidemia, unspecified hyperlipidemia type  Stable with lifestyle modification.  Continue.  Refills given         Discussed with patient possible alternative diagnoses, pt is to take all medications as prescribed. If symptoms persist FU w/PCP, if symptoms worsen go to emergency room. If experiencing any side effects from prescribed medications reports to the office immediately or go to emergency room.  Reviewed indication, dosage, usage and potential adverse effects of prescribed medications. Reviewed risks and benefits of treatment plan. Patient verbalizes understanding of all instruction and verbally agrees to plan.    No follow-ups on file. annual, PRN

## 2021-12-09 NOTE — ASSESSMENT & PLAN NOTE
Chronic and stable condition.  Needing refills today.  Most recent thyroid 9/1/21:  TSH 5.50,  T4 1.07  Currently on Levothyroxine 88 mcg, doing well, no hypo or hyper thyroid symptoms.

## 2021-12-09 NOTE — ASSESSMENT & PLAN NOTE
Hx 36 smoking pack yrs. tried to quit multiple times unsuccessfully d/t anxiety.  Patient states there is a lot going on and this is not a good time to quit smoking.  Currently she denies CP, dyspnea, hemoptysis.  She does admit to smokers cough. She is interested in screening for lung cancer.

## 2021-12-20 ENCOUNTER — TELEPHONE (OUTPATIENT)
Dept: HEMATOLOGY ONCOLOGY | Facility: MEDICAL CENTER | Age: 56
End: 2021-12-20

## 2021-12-20 NOTE — TELEPHONE ENCOUNTER
Received referral to lung cancer screening program.  Chart review to assess for lung cancer screening program eligibility.   1. Age 55-77 yrs of age? Yes 56 y.o.  2. 30 pack year hx of smoking, or greater? Yes 1 [id number]= [id number] hx  3. Current smoker or if quit, has pt quit within last 15 yrs?Yes  Current smoker  4. Any signs or symptoms of lung cancer? None noted  5. Previous history of lung cancer? None noted  6. Chest CT within past 12 mos.? None noted  Patient does meet eligibility criteria. [facility] scheduling notified to schedule the shared decision making visit.

## 2022-03-14 DIAGNOSIS — E89.0 POSTABLATIVE HYPOTHYROIDISM: ICD-10-CM

## 2022-03-15 RX ORDER — LEVOTHYROXINE SODIUM 88 UG/1
TABLET ORAL
Qty: 90 TABLET | Refills: 0 | Status: SHIPPED | OUTPATIENT
Start: 2022-03-15 | End: 2022-06-07

## 2022-03-15 NOTE — TELEPHONE ENCOUNTER
Phone Number Called: [phone]    Call outcome: LVM to notify pt of need to get labs done. Advised to call back with questions    Message: pt needs to have labs drawn within next two months for more refills on thyroid medication

## 2022-03-16 DIAGNOSIS — E89.0 POSTABLATIVE HYPOTHYROIDISM: ICD-10-CM

## 2022-09-22 ENCOUNTER — OFFICE VISIT (OUTPATIENT)
Dept: MEDICAL GROUP | Facility: PHYSICIAN GROUP | Age: 57
End: 2022-09-22

## 2022-09-22 VITALS
RESPIRATION RATE: 12 BRPM | WEIGHT: 157 LBS | HEIGHT: 64 IN | HEART RATE: 85 BPM | TEMPERATURE: 97.7 F | DIASTOLIC BLOOD PRESSURE: 74 MMHG | OXYGEN SATURATION: 96 % | SYSTOLIC BLOOD PRESSURE: 122 MMHG | BODY MASS INDEX: 26.8 KG/M2

## 2022-09-22 DIAGNOSIS — Z12.11 SCREEN FOR COLON CANCER: ICD-10-CM

## 2022-09-22 DIAGNOSIS — Z00.00 PE (PHYSICAL EXAM), ANNUAL: ICD-10-CM

## 2022-09-22 DIAGNOSIS — Z12.31 ENCOUNTER FOR SCREENING MAMMOGRAM FOR BREAST CANCER: ICD-10-CM

## 2022-09-22 DIAGNOSIS — E89.0 POSTABLATIVE HYPOTHYROIDISM: ICD-10-CM

## 2022-09-22 PROCEDURE — 99214 OFFICE O/P EST MOD 30 MIN: CPT | Performed by: NURSE PRACTITIONER

## 2022-09-22 RX ORDER — LEVOTHYROXINE SODIUM 88 UG/1
88 TABLET ORAL
Qty: 90 TABLET | Refills: 0 | Status: SHIPPED | OUTPATIENT
Start: 2022-09-22 | End: 2022-12-13

## 2022-09-22 ASSESSMENT — PATIENT HEALTH QUESTIONNAIRE - PHQ9: CLINICAL INTERPRETATION OF PHQ2 SCORE: 0

## 2022-09-22 NOTE — ASSESSMENT & PLAN NOTE
Chronic and stable.  Last labs TSH elevated at 5.69 and T4 0.95 from 3/14/2022.  Levothyroxine dose was adjusted to 88 mcg.  Patient has not really done labs since.  Reports experiencing chronic anxiety.  Otherwise no palpitations, insomnia or GI issues.  She would like a 90-day refill today.

## 2022-09-22 NOTE — PROGRESS NOTES
Chief Complaint   Patient presents with    Medication Refill     levothyroxine       HISTORY OF PRESENT ILLNESS: Patient is a 57 y.o. female, established patient who presents today to discuss medical problems as listed below:    Health Maintenance:  COMPLETED     Postablative hypothyroidism  Chronic and stable.  Last labs TSH elevated at 5.69 and T4 0.95 from 3/14/2022.  Levothyroxine dose was adjusted to 88 mcg.  Patient has not really done labs since.  Reports experiencing chronic anxiety.  Otherwise no palpitations, insomnia or GI issues.  She would like a 90-day refill today.    Patient Active Problem List    Diagnosis Date Noted    Mixed hyperlipidemia 12/09/2021    Functional diarrhea 08/31/2021    Macrocytosis without anemia 06/20/2019    Essential hypertension 06/20/2019    Hyperlipidemia 06/20/2019    Chronic left-sided low back pain 05/14/2019    Cigarette nicotine dependence without complication 05/14/2019    Postablative hypothyroidism 05/14/2019    Overweight (BMI 25.0-29.9) 05/14/2019    Night sweats 05/14/2019    Raynaud's disease without gangrene 05/14/2019        Allergies: Patient has no known allergies.    Current Outpatient Medications   Medication Sig Dispense Refill    levothyroxine (SYNTHROID) 88 MCG Tab Take 1 Tablet by mouth every morning on an empty stomach. 90 Tablet 0    aspirin 81 MG tablet Take 81 mg by mouth every day.       No current facility-administered medications for this visit.       Social History     Tobacco Use    Smoking status: Every Day     Packs/day: 1.00     Years: 30.00     Pack years: 30.00     Types: Cigarettes    Smokeless tobacco: Never   Vaping Use    Vaping Use: Never used   Substance Use Topics    Alcohol use: Yes     Alcohol/week: 8.4 oz     Types: 14 Cans of beer per week    Drug use: No     Social History     Social History Narrative    [employer]       Family History   Problem Relation Age of Onset    Cancer Mother         bladder, tobacco     "Hyperlipidemia Mother     Heart Disease Father         afib    Cancer Father         pancreatic ca    Diabetes Maternal Grandfather     Heart Attack Paternal Grandfather 94    Other Maternal Aunt         MS    Heart Disease Paternal Aunt         valve replacement       Allergies, past medical history, past surgical history, family history, social history reviewed and updated.    Review of Systems:     - Constitutional: Negative for fever, chills, unexpected weight change, and fatigue/generalized weakness.     - Respiratory: Negative for cough, sputum production, chest congestion, dyspnea, wheezing, and crackles.      - Cardiovascular: Negative for chest pain, palpitations, orthopnea, and bilateral lower extremity edema.     - Psychiatric/Behavioral: Negative for depression, suicidal/homicidal ideation and memory loss.      All other systems reviewed and are negative    Exam:    /74 (BP Location: Left arm, Patient Position: Sitting, BP Cuff Size: Adult)   Pulse 85   Temp 36.5 °C (97.7 °F) (Temporal)   Resp 12   Ht 1.626 m (5' 4\")   Wt 71.2 kg (157 lb)   SpO2 96%   BMI 26.95 kg/m²  Body mass index is 26.95 kg/m².    Physical Exam:  Constitutional: Well-developed and well-nourished. Not diaphoretic. No distress.   Cardiovascular: Regular rate and rhythm, S1 and S2 without murmur, rubs, or gallops.    Chest: Effort normal. Clear to auscultation throughout. No adventitious sounds. Neurological: Alert and oriented x 3.   Psychiatric:  Behavior, mood, and affect are appropriate.  MA/nursing note and vitals reviewed.    LABS: 3/2022 results reviewed and discussed with the patient, questions answered.    Assessment/Plan:  1. Postablative hypothyroidism  Pt to repeat labs and follow-up in 3 to 4 weeks.  Patient would like the rest of annual labs to be printed out separately as she is self-pay and would like to get home essential thyroid labs today.  - levothyroxine (SYNTHROID) 88 MCG Tab; Take 1 Tablet by mouth " every morning on an empty stomach.  Dispense: 90 Tablet; Refill: 0  - TSH; Future  - FREE THYROXINE; Future  - T3 FREE; Future    2. PE (physical exam), annual  - CBC WITHOUT DIFFERENTIAL; Future  - Comp Metabolic Panel; Future  - Lipid Profile; Future    3. Screen for colon cancer  - COLOGUARD (FIT DNA)    4. Encounter for screening mammogram for breast cancer  - MA-SCREENING MAMMO BILAT W/TOMOSYNTHESIS W/CAD; Future       Discussed with patient possible alternative diagnoses, pt is to take all medications as prescribed. If symptoms persist FU w/PCP, if symptoms worsen go to emergency room. If experiencing any side effects from prescribed medications reports to the office immediately or go to emergency room.  Reviewed indication, dosage, usage and potential adverse effects of prescribed medications. Reviewed risks and benefits of treatment plan. Patient verbalizes understanding of all instruction and verbally agrees to plan.    No follow-ups on file. 3-4 wks

## 2023-01-05 ENCOUNTER — APPOINTMENT (OUTPATIENT)
Dept: MEDICAL GROUP | Facility: MEDICAL CENTER | Age: 58
End: 2023-01-05

## 2023-02-09 DIAGNOSIS — Z00.00 PE (PHYSICAL EXAM), ANNUAL: ICD-10-CM

## 2023-02-09 DIAGNOSIS — E89.0 POSTABLATIVE HYPOTHYROIDISM: ICD-10-CM

## 2023-02-09 RX ORDER — ONDANSETRON 4 MG/1
4 TABLET, ORALLY DISINTEGRATING ORAL
COMMUNITY
Start: 2022-12-24 | End: 2023-11-30

## 2023-05-22 SDOH — ECONOMIC STABILITY: HOUSING INSECURITY

## 2023-05-22 SDOH — ECONOMIC STABILITY: INCOME INSECURITY: IN THE LAST 12 MONTHS, WAS THERE A TIME WHEN YOU WERE NOT ABLE TO PAY THE MORTGAGE OR RENT ON TIME?: NO

## 2023-05-22 SDOH — HEALTH STABILITY: PHYSICAL HEALTH: ON AVERAGE, HOW MANY MINUTES DO YOU ENGAGE IN EXERCISE AT THIS LEVEL?: 60 MIN

## 2023-05-22 SDOH — ECONOMIC STABILITY: FOOD INSECURITY: WITHIN THE PAST 12 MONTHS, YOU WORRIED THAT YOUR FOOD WOULD RUN OUT BEFORE YOU GOT MONEY TO BUY MORE.: SOMETIMES TRUE

## 2023-05-22 SDOH — ECONOMIC STABILITY: TRANSPORTATION INSECURITY
IN THE PAST 12 MONTHS, HAS LACK OF TRANSPORTATION KEPT YOU FROM MEETINGS, WORK, OR FROM GETTING THINGS NEEDED FOR DAILY LIVING?: NO

## 2023-05-22 SDOH — ECONOMIC STABILITY: INCOME INSECURITY: HOW HARD IS IT FOR YOU TO PAY FOR THE VERY BASICS LIKE FOOD, HOUSING, MEDICAL CARE, AND HEATING?: HARD

## 2023-05-22 SDOH — ECONOMIC STABILITY: FOOD INSECURITY: WITHIN THE PAST 12 MONTHS, THE FOOD YOU BOUGHT JUST DIDN'T LAST AND YOU DIDN'T HAVE MONEY TO GET MORE.: NEVER TRUE

## 2023-05-22 SDOH — ECONOMIC STABILITY: HOUSING INSECURITY
IN THE LAST 12 MONTHS, WAS THERE A TIME WHEN YOU DID NOT HAVE A STEADY PLACE TO SLEEP OR SLEPT IN A SHELTER (INCLUDING NOW)?: NO

## 2023-05-22 SDOH — HEALTH STABILITY: PHYSICAL HEALTH: ON AVERAGE, HOW MANY DAYS PER WEEK DO YOU ENGAGE IN MODERATE TO STRENUOUS EXERCISE (LIKE A BRISK WALK)?: 3 DAYS

## 2023-05-22 SDOH — ECONOMIC STABILITY: TRANSPORTATION INSECURITY
IN THE PAST 12 MONTHS, HAS THE LACK OF TRANSPORTATION KEPT YOU FROM MEDICAL APPOINTMENTS OR FROM GETTING MEDICATIONS?: YES

## 2023-05-22 SDOH — ECONOMIC STABILITY: TRANSPORTATION INSECURITY
IN THE PAST 12 MONTHS, HAS LACK OF RELIABLE TRANSPORTATION KEPT YOU FROM MEDICAL APPOINTMENTS, MEETINGS, WORK OR FROM GETTING THINGS NEEDED FOR DAILY LIVING?: NO

## 2023-05-22 SDOH — HEALTH STABILITY: MENTAL HEALTH
STRESS IS WHEN SOMEONE FEELS TENSE, NERVOUS, ANXIOUS, OR CAN'T SLEEP AT NIGHT BECAUSE THEIR MIND IS TROUBLED. HOW STRESSED ARE YOU?: TO SOME EXTENT

## 2023-05-22 ASSESSMENT — SOCIAL DETERMINANTS OF HEALTH (SDOH)
DO YOU BELONG TO ANY CLUBS OR ORGANIZATIONS SUCH AS CHURCH GROUPS UNIONS, FRATERNAL OR ATHLETIC GROUPS, OR SCHOOL GROUPS?: NO
HOW OFTEN DO YOU ATTENT MEETINGS OF THE CLUB OR ORGANIZATION YOU BELONG TO?: NEVER
HOW OFTEN DO YOU HAVE A DRINK CONTAINING ALCOHOL: 2-3 TIMES A WEEK
HOW OFTEN DO YOU GET TOGETHER WITH FRIENDS OR RELATIVES?: ONCE A WEEK
DO YOU BELONG TO ANY CLUBS OR ORGANIZATIONS SUCH AS CHURCH GROUPS UNIONS, FRATERNAL OR ATHLETIC GROUPS, OR SCHOOL GROUPS?: NO
HOW OFTEN DO YOU ATTENT MEETINGS OF THE CLUB OR ORGANIZATION YOU BELONG TO?: NEVER
HOW OFTEN DO YOU HAVE SIX OR MORE DRINKS ON ONE OCCASION: NEVER
HOW OFTEN DO YOU ATTEND CHURCH OR RELIGIOUS SERVICES?: NEVER
HOW OFTEN DO YOU GET TOGETHER WITH FRIENDS OR RELATIVES?: ONCE A WEEK
WITHIN THE PAST 12 MONTHS, YOU WORRIED THAT YOUR FOOD WOULD RUN OUT BEFORE YOU GOT THE MONEY TO BUY MORE: SOMETIMES TRUE
IN A TYPICAL WEEK, HOW MANY TIMES DO YOU TALK ON THE PHONE WITH FAMILY, FRIENDS, OR NEIGHBORS?: MORE THAN THREE TIMES A WEEK
HOW OFTEN DO YOU ATTEND CHURCH OR RELIGIOUS SERVICES?: NEVER
HOW HARD IS IT FOR YOU TO PAY FOR THE VERY BASICS LIKE FOOD, HOUSING, MEDICAL CARE, AND HEATING?: HARD
IN A TYPICAL WEEK, HOW MANY TIMES DO YOU TALK ON THE PHONE WITH FAMILY, FRIENDS, OR NEIGHBORS?: MORE THAN THREE TIMES A WEEK
HOW MANY DRINKS CONTAINING ALCOHOL DO YOU HAVE ON A TYPICAL DAY WHEN YOU ARE DRINKING: 1 OR 2

## 2023-05-22 ASSESSMENT — LIFESTYLE VARIABLES
HOW OFTEN DO YOU HAVE SIX OR MORE DRINKS ON ONE OCCASION: NEVER
SKIP TO QUESTIONS 9-10: 1
HOW OFTEN DO YOU HAVE A DRINK CONTAINING ALCOHOL: 2-3 TIMES A WEEK
AUDIT-C TOTAL SCORE: 3
HOW MANY STANDARD DRINKS CONTAINING ALCOHOL DO YOU HAVE ON A TYPICAL DAY: 1 OR 2

## 2023-05-25 ENCOUNTER — OFFICE VISIT (OUTPATIENT)
Dept: MEDICAL GROUP | Facility: PHYSICIAN GROUP | Age: 58
End: 2023-05-25

## 2023-05-25 VITALS
OXYGEN SATURATION: 97 % | SYSTOLIC BLOOD PRESSURE: 132 MMHG | DIASTOLIC BLOOD PRESSURE: 62 MMHG | WEIGHT: 156.75 LBS | HEIGHT: 64 IN | TEMPERATURE: 98 F | BODY MASS INDEX: 26.76 KG/M2 | HEART RATE: 80 BPM | RESPIRATION RATE: 12 BRPM

## 2023-05-25 DIAGNOSIS — E78.5 HYPERLIPIDEMIA, UNSPECIFIED HYPERLIPIDEMIA TYPE: ICD-10-CM

## 2023-05-25 DIAGNOSIS — F17.210 CIGARETTE NICOTINE DEPENDENCE WITHOUT COMPLICATION: ICD-10-CM

## 2023-05-25 DIAGNOSIS — Z12.12 SCREENING FOR COLORECTAL CANCER: ICD-10-CM

## 2023-05-25 DIAGNOSIS — I10 ESSENTIAL HYPERTENSION: ICD-10-CM

## 2023-05-25 DIAGNOSIS — Z12.11 SCREENING FOR COLORECTAL CANCER: ICD-10-CM

## 2023-05-25 DIAGNOSIS — E89.0 POSTABLATIVE HYPOTHYROIDISM: ICD-10-CM

## 2023-05-25 PROCEDURE — 3078F DIAST BP <80 MM HG: CPT | Performed by: STUDENT IN AN ORGANIZED HEALTH CARE EDUCATION/TRAINING PROGRAM

## 2023-05-25 PROCEDURE — 99214 OFFICE O/P EST MOD 30 MIN: CPT | Performed by: STUDENT IN AN ORGANIZED HEALTH CARE EDUCATION/TRAINING PROGRAM

## 2023-05-25 PROCEDURE — 3075F SYST BP GE 130 - 139MM HG: CPT | Performed by: STUDENT IN AN ORGANIZED HEALTH CARE EDUCATION/TRAINING PROGRAM

## 2023-05-25 RX ORDER — LEVOTHYROXINE SODIUM 88 UG/1
88 TABLET ORAL
Qty: 90 TABLET | Refills: 2 | Status: SHIPPED | OUTPATIENT
Start: 2023-05-25 | End: 2024-02-08

## 2023-05-25 RX ORDER — NICOTINE 21 MG/24HR
1 PATCH, TRANSDERMAL 24 HOURS TRANSDERMAL EVERY 24 HOURS
Qty: 42 PATCH | Refills: 0 | Status: SHIPPED | OUTPATIENT
Start: 2023-05-25 | End: 2023-07-06

## 2023-05-25 RX ORDER — NICOTINE 21 MG/24HR
1 PATCH, TRANSDERMAL 24 HOURS TRANSDERMAL EVERY 24 HOURS
Qty: 14 PATCH | Refills: 0 | Status: SHIPPED | OUTPATIENT
Start: 2023-05-25 | End: 2023-06-08

## 2023-05-25 RX ORDER — AMLODIPINE BESYLATE 5 MG/1
5 TABLET ORAL DAILY
Qty: 30 TABLET | Refills: 3 | Status: SHIPPED | OUTPATIENT
Start: 2023-05-25 | End: 2023-07-24 | Stop reason: SDUPTHER

## 2023-05-25 ASSESSMENT — ENCOUNTER SYMPTOMS
FEVER: 0
FOCAL WEAKNESS: 0
SHORTNESS OF BREATH: 0
DIZZINESS: 0
CHILLS: 0
COUGH: 0
ORTHOPNEA: 0
WHEEZING: 0
PALPITATIONS: 0
HEADACHES: 0

## 2023-05-25 ASSESSMENT — PATIENT HEALTH QUESTIONNAIRE - PHQ9: CLINICAL INTERPRETATION OF PHQ2 SCORE: 0

## 2023-05-25 NOTE — ASSESSMENT & PLAN NOTE
Lipid panel 4/25/2023, total cholesterol 230, triglycerides 54,     ascvd risk 5.5%    Advised to stop smoking, cessation guidance given

## 2023-05-25 NOTE — PROGRESS NOTES
HISTORY OF PRESENT ILLNESS: Cheryl is a pleasant 58 y.o. female, established patient who presents today to discuss medical problems as listed below:    Problem   Essential Hypertension    Reports on average at home 135-145 systolic       Hyperlipidemia   Cigarette Nicotine Dependence Without Complication    1 pack per day since she was 21. Reports she is motivated to quit. Has never tried to quit before.        Postablative Hypothyroidism    Currently on levothyroxine 88 mcg daily.  Her last thyroid levels were checked 4/25/2023 TSH normal 2.47, free T4 normal 1.19, T3 normal 2.3.  She is currently needing refills on her medication.            Current Outpatient Medications Ordered in Epic   Medication Sig Dispense Refill    levothyroxine (SYNTHROID) 88 MCG Tab Take 1 Tablet by mouth every morning on an empty stomach. 90 Tablet 2    nicotine (NICODERM) 21 MG/24HR PATCH 24 HR Place 1 Patch on the skin every 24 hours for 42 days. 1 patch on skin every 24 hours for 42 days, then 14mg patch daily for 2 weeks, then 7mg patch daily for 2 weeks. Use nicotine gum as needed for cravings. 42 Patch 0    nicotine (NICODERM) 14 MG/24HR PATCH 24 HR Place 1 Patch on the skin every 24 hours for 14 days. 21mg patch on skin every 24 hours for 42 days, then 14mg patch daily for 2 weeks, then 7mg patch daily for 2 weeks. Use nicotine gum as needed for cravings. 14 Patch 0    nicotine (NICODERM) 7 MG/24HR PATCH 24 HR Place 1 Patch on the skin every 24 hours for 14 days. 21mg patch on skin every 24 hours for 42 days, then 14mg patch daily for 2 weeks, then 7mg patch daily for 2 weeks. Use nicotine gum as needed for cravings. 14 Patch 0    nicotine polacrilex (NICORETTE) 2 MG Gum Take 1 Each by mouth every four hours as needed for Smoking Cessation (every 4 hours as need for cravings). 50 Each 1    amLODIPine (NORVASC) 5 MG Tab Take 1 Tablet by mouth every day. 30 Tablet 3    ondansetron (ZOFRAN ODT) 4 MG TABLET DISPERSIBLE Take 4 mg by  mouth.      aspirin 81 MG tablet Take 81 mg by mouth every day.       No current [facility]-ordered facility-administered medications on file.       Review of systems:  Review of Systems   Constitutional:  Negative for chills and fever.   Respiratory:  Negative for cough, shortness of breath and wheezing.    Cardiovascular:  Negative for chest pain, palpitations, orthopnea and leg swelling.   Genitourinary:  Negative for dysuria.   Musculoskeletal:  Negative for joint pain.   Neurological:  Negative for dizziness, focal weakness and headaches.         Past Medical History:   Diagnosis Date    CHF (congestive heart failure) (HCC)     with Graves, resolved    Left anterior knee pain 2/28/2020     Past Surgical History:   Procedure Laterality Date    CATARACT EXTRACTION WITH IOL      PRIMARY C SECTION      TUBAL LIGATION       Social History     Tobacco Use    Smoking status: Every Day     Packs/day: 1.00     Years: 30.00     Pack years: 30.00     Types: Cigarettes    Smokeless tobacco: Never   Vaping Use    Vaping Use: Never used   Substance Use Topics    Alcohol use: Yes     Alcohol/week: 8.4 oz     Types: 14 Cans of beer per week    Drug use: No      Family History   Problem Relation Age of Onset    Cancer Mother         bladder, tobacco    Hyperlipidemia Mother     Heart Disease Father         afib    Cancer Father         pancreatic ca    Diabetes Maternal Grandfather     Heart Attack Paternal Grandfather 94    Other Maternal Aunt         MS    Heart Disease Paternal Aunt         valve replacement     Current Outpatient Medications   Medication Sig Dispense Refill    levothyroxine (SYNTHROID) 88 MCG Tab Take 1 Tablet by mouth every morning on an empty stomach. 90 Tablet 2    nicotine (NICODERM) 21 MG/24HR PATCH 24 HR Place 1 Patch on the skin every 24 hours for 42 days. 1 patch on skin every 24 hours for 42 days, then 14mg patch daily for 2 weeks, then 7mg patch daily for 2 weeks. Use nicotine gum as needed for  "cravings. 42 Patch 0    nicotine (NICODERM) 14 MG/24HR PATCH 24 HR Place 1 Patch on the skin every 24 hours for 14 days. 21mg patch on skin every 24 hours for 42 days, then 14mg patch daily for 2 weeks, then 7mg patch daily for 2 weeks. Use nicotine gum as needed for cravings. 14 Patch 0    nicotine (NICODERM) 7 MG/24HR PATCH 24 HR Place 1 Patch on the skin every 24 hours for 14 days. 21mg patch on skin every 24 hours for 42 days, then 14mg patch daily for 2 weeks, then 7mg patch daily for 2 weeks. Use nicotine gum as needed for cravings. 14 Patch 0    nicotine polacrilex (NICORETTE) 2 MG Gum Take 1 Each by mouth every four hours as needed for Smoking Cessation (every 4 hours as need for cravings). 50 Each 1    amLODIPine (NORVASC) 5 MG Tab Take 1 Tablet by mouth every day. 30 Tablet 3    ondansetron (ZOFRAN ODT) 4 MG TABLET DISPERSIBLE Take 4 mg by mouth.      aspirin 81 MG tablet Take 81 mg by mouth every day.       No current facility-administered medications for this visit.       Allergies:  No Known Allergies    Allergies, past medical history, past surgical history, family history, social history reviewed and updated.    Objective:    /62 (BP Location: Left arm, Patient Position: Sitting, BP Cuff Size: Adult)   Pulse 80   Temp 36.7 °C (98 °F) (Temporal)   Resp 12   Ht 1.626 m (5' 4\")   Wt 71.1 kg (156 lb 12 oz)   SpO2 97%   BMI 26.91 kg/m²    Body mass index is 26.91 kg/m².    Physical exam:  General: Normal appearance, no acute distress, not ill-appearing  HEENT: EOM intact, conjunctiva normal limits, negative right/left eye discharge.  Sclera anicteric  Cardiovascular: Normal rate and rhythm, no murmurs  Pulmonary: No respiratory distress, no wheezing, no rales, breath sounds normal.  Musculoskeletal: No edema bilaterally  Skin: Warm, dry, no lesions  Neurological: No focal deficits, normal gait  Psychiatric: Mood within normal limits    Assessment/Plan:    Problem List Items Addressed This " Visit       Cigarette nicotine dependence without complication    Relevant Medications    nicotine (NICODERM) 21 MG/24HR PATCH 24 HR    nicotine (NICODERM) 14 MG/24HR PATCH 24 HR    nicotine (NICODERM) 7 MG/24HR PATCH 24 HR    nicotine polacrilex (NICORETTE) 2 MG Gum    Postablative hypothyroidism     Thyroid labs reviewed - stable  Cont synthroid 88 mcg             Relevant Medications    levothyroxine (SYNTHROID) 88 MCG Tab    Essential hypertension     Due to average bp 140s systolic  Advised to DC smoking    Start amlodipine 5mg daily.   Chronic cond not well controlled           Relevant Medications    amLODIPine (NORVASC) 5 MG Tab    Hyperlipidemia     Lipid panel 4/25/2023, total cholesterol 230, triglycerides 54,     ascvd risk 5.5%    Advised to stop smoking, cessation guidance given           Relevant Medications    amLODIPine (NORVASC) 5 MG Tab     Other Visit Diagnoses       Screening for colorectal cancer        Relevant Orders    COLOGUARD (FIT DNA)             Return in about 6 months (around 11/25/2023) for blood pressure.

## 2023-05-25 NOTE — ASSESSMENT & PLAN NOTE
Due to average bp 140s systolic  Advised to DC smoking    Start amlodipine 5mg daily.   Chronic cond not well controlled

## 2023-07-24 DIAGNOSIS — I10 ESSENTIAL HYPERTENSION: ICD-10-CM

## 2023-07-24 RX ORDER — AMLODIPINE BESYLATE 5 MG/1
5 TABLET ORAL DAILY
Qty: 60 TABLET | Refills: 3 | Status: SHIPPED | OUTPATIENT
Start: 2023-07-24 | End: 2023-09-19

## 2023-07-24 NOTE — TELEPHONE ENCOUNTER
Received request via: Pharmacy    Was the patient seen in the last year in this department? Yes    Does the patient have an active prescription (recently filled or refills available) for medication(s) requested? No    Does the patient have [facility] and need 100 day supply (blood pressure, diabetes and cholesterol meds only)? Patient does not have [facility]

## 2023-09-18 DIAGNOSIS — I10 ESSENTIAL HYPERTENSION: ICD-10-CM

## 2023-09-19 RX ORDER — AMLODIPINE BESYLATE 5 MG/1
5 TABLET ORAL DAILY
Qty: 90 TABLET | Refills: 1 | Status: SHIPPED | OUTPATIENT
Start: 2023-09-19

## 2023-11-30 ENCOUNTER — OFFICE VISIT (OUTPATIENT)
Dept: MEDICAL GROUP | Facility: PHYSICIAN GROUP | Age: 58
End: 2023-11-30

## 2023-11-30 VITALS
BODY MASS INDEX: 26.84 KG/M2 | SYSTOLIC BLOOD PRESSURE: 124 MMHG | HEIGHT: 64 IN | OXYGEN SATURATION: 95 % | DIASTOLIC BLOOD PRESSURE: 76 MMHG | WEIGHT: 157.19 LBS | HEART RATE: 90 BPM | TEMPERATURE: 97.7 F | RESPIRATION RATE: 12 BRPM

## 2023-11-30 DIAGNOSIS — Z78.0 POST-MENOPAUSAL: ICD-10-CM

## 2023-11-30 DIAGNOSIS — Z12.31 ENCOUNTER FOR SCREENING MAMMOGRAM FOR BREAST CANCER: ICD-10-CM

## 2023-11-30 DIAGNOSIS — Z12.11 SCREENING FOR COLORECTAL CANCER: ICD-10-CM

## 2023-11-30 DIAGNOSIS — F17.210 CIGARETTE NICOTINE DEPENDENCE WITHOUT COMPLICATION: ICD-10-CM

## 2023-11-30 DIAGNOSIS — I10 ESSENTIAL HYPERTENSION: ICD-10-CM

## 2023-11-30 DIAGNOSIS — Z00.00 HEALTH CARE MAINTENANCE: ICD-10-CM

## 2023-11-30 DIAGNOSIS — E89.0 POSTABLATIVE HYPOTHYROIDISM: ICD-10-CM

## 2023-11-30 DIAGNOSIS — R06.2 WHEEZING: ICD-10-CM

## 2023-11-30 DIAGNOSIS — E78.5 HYPERLIPIDEMIA, UNSPECIFIED HYPERLIPIDEMIA TYPE: ICD-10-CM

## 2023-11-30 DIAGNOSIS — Z12.12 SCREENING FOR COLORECTAL CANCER: ICD-10-CM

## 2023-11-30 PROBLEM — K59.1 FUNCTIONAL DIARRHEA: Status: RESOLVED | Noted: 2021-08-31 | Resolved: 2023-11-30

## 2023-11-30 PROCEDURE — 99396 PREV VISIT EST AGE 40-64: CPT | Performed by: STUDENT IN AN ORGANIZED HEALTH CARE EDUCATION/TRAINING PROGRAM

## 2023-11-30 PROCEDURE — 3074F SYST BP LT 130 MM HG: CPT | Performed by: STUDENT IN AN ORGANIZED HEALTH CARE EDUCATION/TRAINING PROGRAM

## 2023-11-30 PROCEDURE — 3078F DIAST BP <80 MM HG: CPT | Performed by: STUDENT IN AN ORGANIZED HEALTH CARE EDUCATION/TRAINING PROGRAM

## 2023-11-30 RX ORDER — ALBUTEROL SULFATE 90 UG/1
2 AEROSOL, METERED RESPIRATORY (INHALATION) EVERY 4 HOURS PRN
Qty: 1 EACH | Refills: 1 | Status: SHIPPED | OUTPATIENT
Start: 2023-11-30

## 2023-11-30 ASSESSMENT — ENCOUNTER SYMPTOMS
FEVER: 0
COUGH: 0
FOCAL WEAKNESS: 0
BLOOD IN STOOL: 0
SHORTNESS OF BREATH: 0
PALPITATIONS: 0
HEADACHES: 0
NAUSEA: 0
VOMITING: 0
ABDOMINAL PAIN: 0
WHEEZING: 0
DIZZINESS: 0
CHILLS: 0
ORTHOPNEA: 0

## 2023-11-30 NOTE — PROGRESS NOTES
Subjective:     CC:   Chief Complaint   Patient presents with    Follow-Up     BP       HPI:   Cheryl Squires is a 58 y.o. female who presents for annual exam. She is feeling well and denies any complaints.    Ob-Gyn/ History:    Patient has GYN provider: yes  /Para:   1 csection   Last Pap Smear:  3 years ago will be following with gyn  Gyn Surgery:  csection .  Post-menopausal bleeding: none   Urinary incontinence: none     Health Maintenance  Osteoporosis Screen/ DEXA: not indicated   PT/vit D for falls prevention: taking vitamin D - labs ordered   Cholesterol Screening: ordered   Diabetes Screening: fasting glucose ordered   Diet and exercise counseling discussed  Substance Abuse: down to 1/2 pack per day   Safe in relationship.  Seat belts, bike helmet, gun safety discussed.  Sun protection used.    Cancer screening  Colorectal Cancer Screening: FIT test ordered, she is self pay this the cheaper option   Lung Cancer Screening: ordered    Cervical Cancer Screening: 3 years ago, has f/u with gyn    Breast Cancer Screening: ordered. No fam hx     Infectious disease screening/Immunizations  --Immunizations: declines all vaccines      She  has a past medical history of CHF (congestive heart failure) (HCC) and Left anterior knee pain (2020).  She  has a past surgical history that includes cataract extraction with iol; primary c section; and tubal ligation.    Family History   Problem Relation Age of Onset    Cancer Mother         bladder, tobacco    Hyperlipidemia Mother     Heart Disease Father         afib    Cancer Father         pancreatic ca    Diabetes Maternal Grandfather     Heart Attack Paternal Grandfather 94    Other Maternal Aunt         MS    Heart Disease Paternal Aunt         valve replacement       Social History     Socioeconomic History    Marital status: Single     Spouse name: Not on file    Number of children: Not on file    Years of education: Not on file    Highest  education level: 12th grade   Occupational History    Occupation:    Tobacco Use    Smoking status: Every Day     Current packs/day: 1.00     Average packs/day: 1 pack/day for 30.0 years (30.0 ttl pk-yrs)     Types: Cigarettes    Smokeless tobacco: Never   Vaping Use    Vaping Use: Never used   Substance and Sexual Activity    Alcohol use: Yes     Alcohol/week: 8.4 oz     Types: 14 Cans of beer per week    Drug use: No    Sexual activity: Not Currently     Partners: Male   Other Topics Concern    Not on file   Social History Narrative    [employer]     Social Determinants of Health     Financial Resource Strain: High Risk (5/22/2023)    Overall Financial Resource Strain (CARDIA)     Difficulty of Paying Living Expenses: Hard   Food Insecurity: Food Insecurity Present (5/22/2023)    Hunger Vital Sign     Worried About Running Out of Food in the Last Year: Sometimes true     Ran Out of Food in the Last Year: Never true   Transportation Needs: Unmet Transportation Needs (5/22/2023)    PRAPARE - Transportation     Lack of Transportation (Medical): Yes     Lack of Transportation (Non-Medical): No   Physical Activity: Sufficiently Active (5/22/2023)    Exercise Vital Sign     Days of Exercise per Week: 3 days     Minutes of Exercise per Session: 60 min   Stress: Stress Concern Present (5/22/2023)    [employer] - Occupational Stress Questionnaire     Feeling of Stress : To some extent   Social Connections: Socially Isolated (5/22/2023)    Social Connection and Isolation Panel [NHANES]     Frequency of Communication with Friends and Family: More than three times a week     Frequency of Social Gatherings with Friends and Family: Once a week     Attends Christian Services: Never     Active Member of Clubs or Organizations: No     Attends Club or Organization Meetings: Never     Marital Status:    Intimate Partner Violence: Not on file   Housing Stability:  "Unknown (5/22/2023)    Housing Stability Vital Sign     Unable to Pay for Housing in the Last Year: No     Number of Places Lived in the Last Year: Not on file     Unstable Housing in the Last Year: No       Patient Active Problem List    Diagnosis Date Noted    Health care maintenance 11/30/2023    Mixed hyperlipidemia 12/09/2021    Macrocytosis without anemia 06/20/2019    Essential hypertension 06/20/2019    Chronic left-sided low back pain 05/14/2019    Cigarette nicotine dependence without complication 05/14/2019    Postablative hypothyroidism 05/14/2019    Overweight (BMI 25.0-29.9) 05/14/2019    Raynaud's disease without gangrene 05/14/2019         Current Outpatient Medications   Medication Sig Dispense Refill    albuterol 108 (90 Base) MCG/ACT Aero Soln inhalation aerosol Inhale 2 Puffs every four hours as needed for Shortness of Breath. 1 Each 1    amLODIPine (NORVASC) 5 MG Tab TAKE 1 TABLET BY MOUTH DAILY 90 Tablet 1    levothyroxine (SYNTHROID) 88 MCG Tab Take 1 Tablet by mouth every morning on an empty stomach. 90 Tablet 2    aspirin 81 MG tablet Take 81 mg by mouth every day.       No current facility-administered medications for this visit.     No Known Allergies    Review of Systems   Constitutional:  Negative for chills and fever.   Respiratory:  Negative for cough, shortness of breath and wheezing.    Cardiovascular:  Negative for chest pain, palpitations, orthopnea and leg swelling.   Gastrointestinal:  Negative for abdominal pain, blood in stool, melena, nausea and vomiting.   Genitourinary:  Negative for dysuria, frequency and urgency.   Musculoskeletal:  Negative for joint pain.   Neurological:  Negative for dizziness, focal weakness and headaches.        Objective:     /76   Pulse 90   Temp 36.5 °C (97.7 °F) (Temporal)   Resp 12   Ht 1.626 m (5' 4\")   Wt 71.3 kg (157 lb 3 oz)   SpO2 95%   BMI 26.98 kg/m²   Body mass index is 26.98 kg/m².  Wt Readings from Last 4 Encounters: "   11/30/23 71.3 kg (157 lb 3 oz)   05/25/23 71.1 kg (156 lb 12 oz)   09/22/22 71.2 kg (157 lb)   12/09/21 73.2 kg (161 lb 6.4 oz)       Physical Exam  Vitals reviewed.   Constitutional:       General: She is not in acute distress.     Appearance: Normal appearance. She is not ill-appearing or toxic-appearing.   HENT:      Head: Normocephalic and atraumatic.   Eyes:      General: No scleral icterus.        Right eye: No discharge.         Left eye: No discharge.      Conjunctiva/sclera: Conjunctivae normal.   Neck:      Vascular: No carotid bruit.   Cardiovascular:      Rate and Rhythm: Normal rate and regular rhythm.      Pulses: Normal pulses.      Heart sounds: Normal heart sounds. No murmur heard.  Pulmonary:      Effort: Pulmonary effort is normal. No respiratory distress.      Breath sounds: Normal breath sounds. No wheezing or rales.   Abdominal:      General: Abdomen is flat. Bowel sounds are normal.      Palpations: Abdomen is soft.   Musculoskeletal:      Cervical back: Neck supple.      Right lower leg: No edema.      Left lower leg: No edema.   Skin:     General: Skin is warm and dry.   Neurological:      General: No focal deficit present.      Mental Status: She is alert.   Psychiatric:         Mood and Affect: Mood normal.         Thought Content: Thought content normal.          Assessment and Plan:     Problem List Items Addressed This Visit       Cigarette nicotine dependence without complication     Down to 1/2 pack per day  Side effects to nicotine patches/gum  Declines chantix           Relevant Orders    REFERRAL TO LUNG CANCER SCREENING PROGRAM    Postablative hypothyroidism     Synthroid 88mcg  Labs ordered for yearly f/u     Chronic stable cond         Relevant Orders    TSH+FREE T4    Essential hypertension     Chronic stable cond  Cont amlodipine 5mg daily          Relevant Orders    Comp Metabolic Panel    CBC WITHOUT DIFFERENTIAL    Health care maintenance     Declines all  vaccines    Patient Counseling:  --Discussed moderation in caloric intake, sufficient fresh fruits/vegetables, fiber, iron  --Discussed brushing, flossing, and dental visits.   --Encouraged regular exercise.   --Discussed tobacco, alcohol, or other drug use.  --Discussed sexually transmitted infections, partner selection  --Injury prevention: Discussed safety belts, safety helmets, smoke detector, etc.          Other Visit Diagnoses       Hyperlipidemia, unspecified hyperlipidemia type        Relevant Orders    LIPID PANEL    Post-menopausal        Relevant Orders    VITAMIN D,25 HYDROXY (DEFICIENCY)    Screening for colorectal cancer        Relevant Orders    OCCULT BLOOD FECES IMMUNOASSAY (FIT)    Encounter for screening mammogram for breast cancer        Relevant Orders    MA-SCREENING MAMMO BILAT W/TOMOSYNTHESIS W/CAD    Wheezing        Relevant Medications    albuterol 108 (90 Base) MCG/ACT Aero Soln inhalation aerosol            Follow-up: Return in about 1 year (around 11/30/2024) for blood pressure, AWV.

## 2023-11-30 NOTE — ASSESSMENT & PLAN NOTE
Declines all vaccines    Patient Counseling:  --Discussed moderation in caloric intake, sufficient fresh fruits/vegetables, fiber, iron  --Discussed brushing, flossing, and dental visits.   --Encouraged regular exercise.   --Discussed tobacco, alcohol, or other drug use.  --Discussed sexually transmitted infections, partner selection  --Injury prevention: Discussed safety belts, safety helmets, smoke detector, etc.

## 2023-12-06 ENCOUNTER — TELEPHONE (OUTPATIENT)
Dept: SLEEP MEDICINE | Facility: MEDICAL CENTER | Age: 58
End: 2023-12-06

## 2023-12-06 NOTE — TELEPHONE ENCOUNTER
Referred by:  Rosa Elena Santa     1. Age 50-77 yrs of age? 58 yrs    2. Total Years Smoking cigarettes? 40 yrs    3. 20 pack year hx of smoking, or greater (average packs per day)?   40 pk yrs @ 1 pk/day     4. Current smoker or if quit, has pt quit within last 15 yrs?  Current  - ready to quit/ interested in cessation info    5. Completed Lung Cancer screen CT with [facility] previously?  NONE    6. Anything noted on previous CT involving lungs? ([place], [place], Etc.)  NONE    7. Any signs or symptoms of lung cancer? ( New / change to Cough, Wheezing, S.O.B., coughing up blood, unexplained weight loss within last year)?   NONE  - PCP noted smokers wheeze- told patient could end up as COPD if smoking continues    8. Previous history of lung cancer?   NONE

## 2024-02-07 DIAGNOSIS — E89.0 POSTABLATIVE HYPOTHYROIDISM: ICD-10-CM

## 2024-02-08 RX ORDER — LEVOTHYROXINE SODIUM 88 UG/1
88 TABLET ORAL
Qty: 90 TABLET | Refills: 2 | Status: SHIPPED | OUTPATIENT
Start: 2024-02-08

## 2024-02-08 NOTE — TELEPHONE ENCOUNTER
Received request via: Pharmacy    Was the patient seen in the last year in this department? Yes    Does the patient have an active prescription (recently filled or refills available) for medication(s) requested? No    Pharmacy Name: [facility]     Does the patient have [facility] and need 100 day supply (blood pressure, diabetes and cholesterol meds only)? Patient does not have [facility]

## 2024-11-07 DIAGNOSIS — E89.0 POSTABLATIVE HYPOTHYROIDISM: ICD-10-CM

## 2024-11-07 NOTE — TELEPHONE ENCOUNTER
Received request via: Pharmacy    Was the patient seen in the last year in this department? Yes    Does the patient have an active prescription (recently filled or refills available) for medication(s) requested? No    Pharm[facility] Name: [facility]     Does the patient have [facility] and need 100-day supply? (This applies to ALL medications) Patient does not have [facility]CP

## 2024-11-08 RX ORDER — LEVOTHYROXINE SODIUM 88 UG/1
88 TABLET ORAL
Qty: 90 TABLET | Refills: 2 | Status: SHIPPED | OUTPATIENT
Start: 2024-11-08

## 2025-01-16 ENCOUNTER — APPOINTMENT (OUTPATIENT)
Dept: MEDICAL GROUP | Facility: PHYSICIAN GROUP | Age: 60
End: 2025-01-16

## 2025-02-06 ENCOUNTER — APPOINTMENT (OUTPATIENT)
Dept: MEDICAL GROUP | Facility: PHYSICIAN GROUP | Age: 60
End: 2025-02-06